# Patient Record
Sex: FEMALE | Race: WHITE | Employment: OTHER | URBAN - METROPOLITAN AREA
[De-identification: names, ages, dates, MRNs, and addresses within clinical notes are randomized per-mention and may not be internally consistent; named-entity substitution may affect disease eponyms.]

---

## 2017-07-07 ENCOUNTER — APPOINTMENT (OUTPATIENT)
Dept: GENERAL RADIOLOGY | Facility: HOSPITAL | Age: 77
End: 2017-07-07
Attending: EMERGENCY MEDICINE
Payer: MEDICARE

## 2017-07-07 ENCOUNTER — APPOINTMENT (OUTPATIENT)
Dept: CT IMAGING | Facility: HOSPITAL | Age: 77
End: 2017-07-07
Attending: EMERGENCY MEDICINE
Payer: MEDICARE

## 2017-07-07 ENCOUNTER — HOSPITAL ENCOUNTER (EMERGENCY)
Facility: HOSPITAL | Age: 77
Discharge: HOME | End: 2017-07-07
Attending: EMERGENCY MEDICINE
Payer: MEDICARE

## 2017-07-07 VITALS
TEMPERATURE: 98 F | SYSTOLIC BLOOD PRESSURE: 143 MMHG | RESPIRATION RATE: 18 BRPM | OXYGEN SATURATION: 98 % | HEIGHT: 62 IN | DIASTOLIC BLOOD PRESSURE: 50 MMHG | BODY MASS INDEX: 26.87 KG/M2 | WEIGHT: 146 LBS | HEART RATE: 64 BPM

## 2017-07-07 DIAGNOSIS — S01.81XA FACIAL LACERATION, INITIAL ENCOUNTER: ICD-10-CM

## 2017-07-07 DIAGNOSIS — S20.211A RIB CONTUSION, RIGHT, INITIAL ENCOUNTER: Primary | ICD-10-CM

## 2017-07-07 PROCEDURE — 71101 X-RAY EXAM UNILAT RIBS/CHEST: CPT | Performed by: EMERGENCY MEDICINE

## 2017-07-07 PROCEDURE — 12013 RPR F/E/E/N/L/M 2.6-5.0 CM: CPT

## 2017-07-07 PROCEDURE — 70450 CT HEAD/BRAIN W/O DYE: CPT | Performed by: EMERGENCY MEDICINE

## 2017-07-07 PROCEDURE — 99284 EMERGENCY DEPT VISIT MOD MDM: CPT

## 2017-07-07 RX ORDER — DOXEPIN HYDROCHLORIDE 50 MG/1
1 CAPSULE ORAL DAILY
COMMUNITY

## 2017-07-07 RX ORDER — HYDROCHLOROTHIAZIDE 25 MG/1
25 TABLET ORAL DAILY
COMMUNITY

## 2017-07-07 RX ORDER — PANTOPRAZOLE SODIUM 20 MG/1
20 TABLET, DELAYED RELEASE ORAL
COMMUNITY

## 2017-07-07 RX ORDER — CHOLECALCIFEROL (VITAMIN D3) 50 MCG
CAPSULE ORAL
COMMUNITY

## 2017-07-07 RX ORDER — NIACIN 1000 MG
1000 TABLET, EXTENDED RELEASE ORAL NIGHTLY
COMMUNITY

## 2017-07-07 NOTE — ED INITIAL ASSESSMENT (HPI)
Patient arrives with laceration to left forehead. Patient tripped over dog gait and landed on hardwood floor. Also c/o right scapular pain and rib pain.  Skin tear also on right wrist.

## 2017-07-07 NOTE — ED PROVIDER NOTES
Patient Seen in: [facility] Emergency Department    History   Patient presents with:  Laceration Abrasion (integumentary)  Fall (musculoskeletal, neurologic)    Stated Complaint: Fall, head lac    HPI    60-year-old female with a history diabetes, h agreed except as otherwise stated in HPI.     Physical Exam   ED Triage Vitals [07/07/17 0253]  BP: 155/58  Pulse: 72  Resp: 18  Temp: 97.7 °F (36.5 °C)  Temp src: Temporal  SpO2: 96 %  O2 Device: None (Room air)    Current:/50   Pulse 64   Temp 97.7 days.  Head injury precautions given.  ============================================================  MDM     Laceration was anesthetized with lidocaine with epinephrine topically. The wound was cleansed and irrigated copiously.   There was no visible forei

## 2023-10-15 ENCOUNTER — HOSPITAL ENCOUNTER (INPATIENT)
Facility: HOSPITAL | Age: 83
LOS: 3 days | Discharge: HOME HEALTH | DRG: 193 | End: 2023-10-18
Attending: EMERGENCY MEDICINE | Admitting: INTERNAL MEDICINE
Payer: MEDICARE

## 2023-10-15 ENCOUNTER — APPOINTMENT (OUTPATIENT)
Dept: GENERAL RADIOLOGY | Facility: HOSPITAL | Age: 83
DRG: 193 | End: 2023-10-15
Attending: EMERGENCY MEDICINE
Payer: MEDICARE

## 2023-10-15 ENCOUNTER — HOSPITAL ENCOUNTER (INPATIENT)
Facility: HOSPITAL | Age: 83
LOS: 3 days | Discharge: HOME | DRG: 193 | End: 2023-10-18
Attending: EMERGENCY MEDICINE | Admitting: INTERNAL MEDICINE
Payer: MEDICARE

## 2023-10-15 DIAGNOSIS — J18.9 COMMUNITY ACQUIRED PNEUMONIA, UNSPECIFIED LATERALITY: Primary | ICD-10-CM

## 2023-10-15 DIAGNOSIS — I50.9 ACUTE ON CHRONIC CONGESTIVE HEART FAILURE, UNSPECIFIED HEART FAILURE TYPE (HCC): ICD-10-CM

## 2023-10-15 DIAGNOSIS — R09.02 HYPOXIA: ICD-10-CM

## 2023-10-15 LAB
ALBUMIN SERPL-MCNC: 3.6 G/DL (ref 3.4–5)
ALBUMIN/GLOB SERPL: 1 {RATIO} (ref 1–2)
ALP LIVER SERPL-CCNC: 71 U/L
ALT SERPL-CCNC: 18 U/L
ANION GAP SERPL CALC-SCNC: 4 MMOL/L (ref 0–18)
APTT PPP: 35.2 SECONDS (ref 23.3–35.6)
AST SERPL-CCNC: 13 U/L (ref 15–37)
BASOPHILS # BLD AUTO: 0.04 X10(3) UL (ref 0–0.2)
BASOPHILS NFR BLD AUTO: 0.6 %
BILIRUB SERPL-MCNC: 0.7 MG/DL (ref 0.1–2)
BUN BLD-MCNC: 12 MG/DL (ref 7–18)
CALCIUM BLD-MCNC: 9.5 MG/DL (ref 8.5–10.1)
CHLORIDE SERPL-SCNC: 105 MMOL/L (ref 98–112)
CO2 SERPL-SCNC: 30 MMOL/L (ref 21–32)
CREAT BLD-MCNC: 0.94 MG/DL
EGFRCR SERPLBLD CKD-EPI 2021: 61 ML/MIN/1.73M2 (ref 60–?)
EOSINOPHIL # BLD AUTO: 0.03 X10(3) UL (ref 0–0.7)
EOSINOPHIL NFR BLD AUTO: 0.4 %
ERYTHROCYTE [DISTWIDTH] IN BLOOD BY AUTOMATED COUNT: 14.6 %
GLOBULIN PLAS-MCNC: 3.7 G/DL (ref 2.8–4.4)
GLUCOSE BLD-MCNC: 114 MG/DL (ref 70–99)
GLUCOSE BLD-MCNC: 167 MG/DL (ref 70–99)
HCT VFR BLD AUTO: 40.6 %
HGB BLD-MCNC: 13.3 G/DL
IMM GRANULOCYTES # BLD AUTO: 0.04 X10(3) UL (ref 0–1)
IMM GRANULOCYTES NFR BLD: 0.6 %
INR BLD: 1.22 (ref 0.85–1.16)
LACTATE SERPL-SCNC: 1.5 MMOL/L (ref 0.4–2)
LYMPHOCYTES # BLD AUTO: 1.04 X10(3) UL (ref 1–4)
LYMPHOCYTES NFR BLD AUTO: 14.8 %
MCH RBC QN AUTO: 26.8 PG (ref 26–34)
MCHC RBC AUTO-ENTMCNC: 32.8 G/DL (ref 31–37)
MCV RBC AUTO: 81.9 FL
MONOCYTES # BLD AUTO: 0.55 X10(3) UL (ref 0.1–1)
MONOCYTES NFR BLD AUTO: 7.8 %
NEUTROPHILS # BLD AUTO: 5.31 X10 (3) UL (ref 1.5–7.7)
NEUTROPHILS # BLD AUTO: 5.31 X10(3) UL (ref 1.5–7.7)
NEUTROPHILS NFR BLD AUTO: 75.8 %
NT-PROBNP SERPL-MCNC: 2374 PG/ML (ref ?–450)
OSMOLALITY SERPL CALC.SUM OF ELEC: 292 MOSM/KG (ref 275–295)
PLATELET # BLD AUTO: 203 10(3)UL (ref 150–450)
POTASSIUM SERPL-SCNC: 3.6 MMOL/L (ref 3.5–5.1)
PROCALCITONIN SERPL-MCNC: <0.05 NG/ML (ref ?–0.16)
PROT SERPL-MCNC: 7.3 G/DL (ref 6.4–8.2)
PROTHROMBIN TIME: 15.4 SECONDS (ref 11.6–14.8)
RBC # BLD AUTO: 4.96 X10(6)UL
SARS-COV-2 RNA RESP QL NAA+PROBE: NOT DETECTED
SODIUM SERPL-SCNC: 139 MMOL/L (ref 136–145)
TROPONIN I HIGH SENSITIVITY: 21 NG/L
WBC # BLD AUTO: 7 X10(3) UL (ref 4–11)

## 2023-10-15 PROCEDURE — 85025 COMPLETE CBC W/AUTO DIFF WBC: CPT | Performed by: EMERGENCY MEDICINE

## 2023-10-15 PROCEDURE — 82962 GLUCOSE BLOOD TEST: CPT

## 2023-10-15 PROCEDURE — 80053 COMPREHEN METABOLIC PANEL: CPT | Performed by: EMERGENCY MEDICINE

## 2023-10-15 PROCEDURE — 93010 ELECTROCARDIOGRAM REPORT: CPT

## 2023-10-15 PROCEDURE — 96365 THER/PROPH/DIAG IV INF INIT: CPT

## 2023-10-15 PROCEDURE — 84145 PROCALCITONIN (PCT): CPT | Performed by: EMERGENCY MEDICINE

## 2023-10-15 PROCEDURE — 85730 THROMBOPLASTIN TIME PARTIAL: CPT | Performed by: EMERGENCY MEDICINE

## 2023-10-15 PROCEDURE — 83036 HEMOGLOBIN GLYCOSYLATED A1C: CPT | Performed by: INTERNAL MEDICINE

## 2023-10-15 PROCEDURE — 84484 ASSAY OF TROPONIN QUANT: CPT | Performed by: EMERGENCY MEDICINE

## 2023-10-15 PROCEDURE — 99285 EMERGENCY DEPT VISIT HI MDM: CPT

## 2023-10-15 PROCEDURE — 71045 X-RAY EXAM CHEST 1 VIEW: CPT | Performed by: EMERGENCY MEDICINE

## 2023-10-15 PROCEDURE — 83605 ASSAY OF LACTIC ACID: CPT | Performed by: EMERGENCY MEDICINE

## 2023-10-15 PROCEDURE — 96375 TX/PRO/DX INJ NEW DRUG ADDON: CPT

## 2023-10-15 PROCEDURE — 83880 ASSAY OF NATRIURETIC PEPTIDE: CPT | Performed by: EMERGENCY MEDICINE

## 2023-10-15 PROCEDURE — 85610 PROTHROMBIN TIME: CPT | Performed by: EMERGENCY MEDICINE

## 2023-10-15 PROCEDURE — 93005 ELECTROCARDIOGRAM TRACING: CPT

## 2023-10-15 RX ORDER — ONDANSETRON 2 MG/ML
4 INJECTION INTRAMUSCULAR; INTRAVENOUS EVERY 6 HOURS PRN
Status: DISCONTINUED | OUTPATIENT
Start: 2023-10-15 | End: 2023-10-18

## 2023-10-15 RX ORDER — INSULIN GLARGINE 100 [IU]/ML
INJECTION, SOLUTION SUBCUTANEOUS NIGHTLY
COMMUNITY

## 2023-10-15 RX ORDER — DEXTROSE MONOHYDRATE 25 G/50ML
50 INJECTION, SOLUTION INTRAVENOUS
Status: DISCONTINUED | OUTPATIENT
Start: 2023-10-15 | End: 2023-10-18

## 2023-10-15 RX ORDER — PANTOPRAZOLE SODIUM 20 MG/1
20 TABLET, DELAYED RELEASE ORAL
Status: DISCONTINUED | OUTPATIENT
Start: 2023-10-16 | End: 2023-10-18

## 2023-10-15 RX ORDER — NICOTINE POLACRILEX 4 MG
30 LOZENGE BUCCAL
Status: DISCONTINUED | OUTPATIENT
Start: 2023-10-15 | End: 2023-10-18

## 2023-10-15 RX ORDER — NITROFURANTOIN 25; 75 MG/1; MG/1
100 CAPSULE ORAL 2 TIMES DAILY
COMMUNITY

## 2023-10-15 RX ORDER — MELATONIN
3 NIGHTLY PRN
Status: DISCONTINUED | OUTPATIENT
Start: 2023-10-15 | End: 2023-10-18

## 2023-10-15 RX ORDER — METOCLOPRAMIDE HYDROCHLORIDE 5 MG/ML
5 INJECTION INTRAMUSCULAR; INTRAVENOUS EVERY 8 HOURS PRN
Status: DISCONTINUED | OUTPATIENT
Start: 2023-10-15 | End: 2023-10-18

## 2023-10-15 RX ORDER — NICOTINE POLACRILEX 4 MG
15 LOZENGE BUCCAL
Status: DISCONTINUED | OUTPATIENT
Start: 2023-10-15 | End: 2023-10-18

## 2023-10-15 RX ORDER — ACETAMINOPHEN 500 MG
500 TABLET ORAL EVERY 4 HOURS PRN
Status: DISCONTINUED | OUTPATIENT
Start: 2023-10-15 | End: 2023-10-18

## 2023-10-15 RX ORDER — DICYCLOMINE HCL 20 MG
20 TABLET ORAL 3 TIMES DAILY PRN
COMMUNITY

## 2023-10-15 RX ORDER — ASPIRIN 81 MG/1
81 TABLET ORAL DAILY
COMMUNITY

## 2023-10-15 RX ORDER — LOPERAMIDE HYDROCHLORIDE 2 MG/1
2 CAPSULE ORAL AS NEEDED
COMMUNITY

## 2023-10-15 RX ORDER — FAMOTIDINE 20 MG/1
20 TABLET, FILM COATED ORAL 2 TIMES DAILY
COMMUNITY

## 2023-10-15 RX ORDER — METOPROLOL SUCCINATE 50 MG/1
50 TABLET, EXTENDED RELEASE ORAL DAILY
COMMUNITY

## 2023-10-15 RX ORDER — CRANBERRY FRUIT EXTRACT 425 MG
CAPSULE ORAL 2 TIMES DAILY
COMMUNITY

## 2023-10-15 RX ORDER — FUROSEMIDE 10 MG/ML
20 INJECTION INTRAMUSCULAR; INTRAVENOUS ONCE
Status: COMPLETED | OUTPATIENT
Start: 2023-10-15 | End: 2023-10-15

## 2023-10-15 RX ORDER — DONEPEZIL HYDROCHLORIDE 10 MG/1
10 TABLET, FILM COATED ORAL DAILY
COMMUNITY

## 2023-10-15 RX ORDER — MELATONIN
1000 NIGHTLY
Status: DISCONTINUED | OUTPATIENT
Start: 2023-10-15 | End: 2023-10-18

## 2023-10-15 RX ORDER — AMLODIPINE BESYLATE 5 MG/1
5 TABLET ORAL DAILY
COMMUNITY

## 2023-10-15 RX ORDER — ENOXAPARIN SODIUM 100 MG/ML
40 INJECTION SUBCUTANEOUS DAILY
Status: DISCONTINUED | OUTPATIENT
Start: 2023-10-16 | End: 2023-10-18

## 2023-10-15 NOTE — ED INITIAL ASSESSMENT (HPI)
Pt to ER with c/o chest congestion, cough, symptoms started this morning.  + Covid beginning of September. Pt Denies fever. Pt from Assisted living facility, x-ray done today. Sent for possible pneumonia.

## 2023-10-16 ENCOUNTER — APPOINTMENT (OUTPATIENT)
Dept: CV DIAGNOSTICS | Facility: HOSPITAL | Age: 83
DRG: 193 | End: 2023-10-16
Attending: INTERNAL MEDICINE
Payer: MEDICARE

## 2023-10-16 LAB
ANION GAP SERPL CALC-SCNC: 7 MMOL/L (ref 0–18)
ATRIAL RATE: 94 BPM
BASOPHILS # BLD AUTO: 0.02 X10(3) UL (ref 0–0.2)
BASOPHILS NFR BLD AUTO: 0.3 %
BUN BLD-MCNC: 12 MG/DL (ref 7–18)
CALCIUM BLD-MCNC: 9.2 MG/DL (ref 8.5–10.1)
CHLORIDE SERPL-SCNC: 106 MMOL/L (ref 98–112)
CO2 SERPL-SCNC: 28 MMOL/L (ref 21–32)
CREAT BLD-MCNC: 0.9 MG/DL
EGFRCR SERPLBLD CKD-EPI 2021: 64 ML/MIN/1.73M2 (ref 60–?)
EOSINOPHIL # BLD AUTO: 0.01 X10(3) UL (ref 0–0.7)
EOSINOPHIL NFR BLD AUTO: 0.1 %
ERYTHROCYTE [DISTWIDTH] IN BLOOD BY AUTOMATED COUNT: 14.6 %
GLUCOSE BLD-MCNC: 109 MG/DL (ref 70–99)
GLUCOSE BLD-MCNC: 151 MG/DL (ref 70–99)
GLUCOSE BLD-MCNC: 208 MG/DL (ref 70–99)
GLUCOSE BLD-MCNC: 214 MG/DL (ref 70–99)
GLUCOSE BLD-MCNC: 229 MG/DL (ref 70–99)
GLUCOSE BLD-MCNC: 94 MG/DL (ref 70–99)
HCT VFR BLD AUTO: 39.6 %
HGB BLD-MCNC: 12.8 G/DL
IMM GRANULOCYTES # BLD AUTO: 0.03 X10(3) UL (ref 0–1)
IMM GRANULOCYTES NFR BLD: 0.4 %
LYMPHOCYTES # BLD AUTO: 0.94 X10(3) UL (ref 1–4)
LYMPHOCYTES NFR BLD AUTO: 13.1 %
MCH RBC QN AUTO: 27.1 PG (ref 26–34)
MCHC RBC AUTO-ENTMCNC: 32.3 G/DL (ref 31–37)
MCV RBC AUTO: 83.7 FL
MONOCYTES # BLD AUTO: 0.72 X10(3) UL (ref 0.1–1)
MONOCYTES NFR BLD AUTO: 10 %
NEUTROPHILS # BLD AUTO: 5.46 X10 (3) UL (ref 1.5–7.7)
NEUTROPHILS # BLD AUTO: 5.46 X10(3) UL (ref 1.5–7.7)
NEUTROPHILS NFR BLD AUTO: 76.1 %
OSMOLALITY SERPL CALC.SUM OF ELEC: 292 MOSM/KG (ref 275–295)
P AXIS: 67 DEGREES
P-R INTERVAL: 152 MS
PLATELET # BLD AUTO: 179 10(3)UL (ref 150–450)
POTASSIUM SERPL-SCNC: 3.2 MMOL/L (ref 3.5–5.1)
Q-T INTERVAL: 370 MS
QRS DURATION: 70 MS
QTC CALCULATION (BEZET): 462 MS
R AXIS: 44 DEGREES
RBC # BLD AUTO: 4.73 X10(6)UL
SODIUM SERPL-SCNC: 141 MMOL/L (ref 136–145)
T AXIS: 77 DEGREES
VENTRICULAR RATE: 94 BPM
WBC # BLD AUTO: 7.2 X10(3) UL (ref 4–11)

## 2023-10-16 PROCEDURE — 82962 GLUCOSE BLOOD TEST: CPT

## 2023-10-16 PROCEDURE — 93306 TTE W/DOPPLER COMPLETE: CPT | Performed by: INTERNAL MEDICINE

## 2023-10-16 PROCEDURE — 85025 COMPLETE CBC W/AUTO DIFF WBC: CPT | Performed by: INTERNAL MEDICINE

## 2023-10-16 PROCEDURE — 80048 BASIC METABOLIC PNL TOTAL CA: CPT | Performed by: INTERNAL MEDICINE

## 2023-10-16 PROCEDURE — 94640 AIRWAY INHALATION TREATMENT: CPT

## 2023-10-16 RX ORDER — AMLODIPINE BESYLATE 5 MG/1
5 TABLET ORAL DAILY
Status: DISCONTINUED | OUTPATIENT
Start: 2023-10-16 | End: 2023-10-18

## 2023-10-16 RX ORDER — METOPROLOL SUCCINATE 50 MG/1
50 TABLET, EXTENDED RELEASE ORAL
Status: DISCONTINUED | OUTPATIENT
Start: 2023-10-16 | End: 2023-10-18

## 2023-10-16 RX ORDER — FUROSEMIDE 10 MG/ML
20 INJECTION INTRAMUSCULAR; INTRAVENOUS ONCE
Status: COMPLETED | OUTPATIENT
Start: 2023-10-16 | End: 2023-10-16

## 2023-10-16 RX ORDER — ASPIRIN 81 MG/1
81 TABLET ORAL DAILY
Status: DISCONTINUED | OUTPATIENT
Start: 2023-10-16 | End: 2023-10-18

## 2023-10-16 RX ORDER — IPRATROPIUM BROMIDE AND ALBUTEROL SULFATE 2.5; .5 MG/3ML; MG/3ML
3 SOLUTION RESPIRATORY (INHALATION) EVERY 6 HOURS PRN
Status: DISCONTINUED | OUTPATIENT
Start: 2023-10-16 | End: 2023-10-18

## 2023-10-16 RX ORDER — DONEPEZIL HYDROCHLORIDE 10 MG/1
10 TABLET, FILM COATED ORAL DAILY
Status: DISCONTINUED | OUTPATIENT
Start: 2023-10-16 | End: 2023-10-18

## 2023-10-16 NOTE — ED QUICK NOTES
Orders for admission, patient is aware of plan and ready to go upstairs. Any questions, please call ED RN Nerissa Cruz at extension [phone].      Patient Covid vaccination status: Fully vaccinated     COVID Test Ordered in ED: Rapid SARS-CoV-2 by PCR    COVID Suspicion at Admission: N/A    Running Infusions:      Mental Status/LOC at time of transport: AO x 4    Other pertinent information: PureWick  Oxygen via Nc @ 3L  CIWA score: N/A   NIH score:  N/A

## 2023-10-16 NOTE — PLAN OF CARE
Received patient on NC 2L, saturations dropped so O2 increased to 3L. Patient now saturating well. VSS. Medications administered per the [facility] and patient needs addressed. Patient is A & O x4, forgetful, incontinent with purewick in place, and up with assist and walker. Patient is up in locked bedside chair with chair alarm on and call light within reach.

## 2023-10-16 NOTE — PROGRESS NOTES
NURSING ADMISSION NOTE      Patient admitted via Cart  Oriented to room. Safety precautions initiated. Bed in low position. Call light in reach. AO x4. Forgetful. Received on 3L, will wean as tolerated. Baseline is RA. PRN Nebs. Tele - Nsr. Lovenox. Purewick. Incontinent. Briefed. Noreports of pain. Up x1 with walker. PT/OT. QID accuchecks. IV SL. Zosyn. Cardiac carb controlled diet. Pt updated on POC. No further needs at this moment. Plan for 2D ECHO.  Med rec and navigator completed with daughter at bedside,

## 2023-10-17 LAB
ANION GAP SERPL CALC-SCNC: 8 MMOL/L (ref 0–18)
BUN BLD-MCNC: 11 MG/DL (ref 7–18)
CALCIUM BLD-MCNC: 8.7 MG/DL (ref 8.5–10.1)
CHLORIDE SERPL-SCNC: 103 MMOL/L (ref 98–112)
CO2 SERPL-SCNC: 29 MMOL/L (ref 21–32)
CREAT BLD-MCNC: 0.91 MG/DL
EGFRCR SERPLBLD CKD-EPI 2021: 63 ML/MIN/1.73M2 (ref 60–?)
GLUCOSE BLD-MCNC: 127 MG/DL (ref 70–99)
GLUCOSE BLD-MCNC: 144 MG/DL (ref 70–99)
GLUCOSE BLD-MCNC: 168 MG/DL (ref 70–99)
GLUCOSE BLD-MCNC: 242 MG/DL (ref 70–99)
GLUCOSE BLD-MCNC: 97 MG/DL (ref 70–99)
HGBA1C: 6.4 %
MAGNESIUM SERPL-MCNC: 1.9 MG/DL (ref 1.6–2.6)
OSMOLALITY SERPL CALC.SUM OF ELEC: 292 MOSM/KG (ref 275–295)
POTASSIUM SERPL-SCNC: 3.5 MMOL/L (ref 3.5–5.1)
POTASSIUM SERPL-SCNC: 4.2 MMOL/L (ref 3.5–5.1)
SODIUM SERPL-SCNC: 140 MMOL/L (ref 136–145)

## 2023-10-17 PROCEDURE — 97166 OT EVAL MOD COMPLEX 45 MIN: CPT

## 2023-10-17 PROCEDURE — 97162 PT EVAL MOD COMPLEX 30 MIN: CPT

## 2023-10-17 PROCEDURE — 97116 GAIT TRAINING THERAPY: CPT

## 2023-10-17 PROCEDURE — 87449 NOS EACH ORGANISM AG IA: CPT | Performed by: HOSPITALIST

## 2023-10-17 PROCEDURE — 97530 THERAPEUTIC ACTIVITIES: CPT

## 2023-10-17 PROCEDURE — 80048 BASIC METABOLIC PNL TOTAL CA: CPT | Performed by: HOSPITALIST

## 2023-10-17 PROCEDURE — 84132 ASSAY OF SERUM POTASSIUM: CPT | Performed by: HOSPITALIST

## 2023-10-17 PROCEDURE — 97535 SELF CARE MNGMENT TRAINING: CPT

## 2023-10-17 PROCEDURE — 82962 GLUCOSE BLOOD TEST: CPT

## 2023-10-17 PROCEDURE — 83735 ASSAY OF MAGNESIUM: CPT | Performed by: HOSPITALIST

## 2023-10-17 RX ORDER — POTASSIUM CHLORIDE 20 MEQ/1
40 TABLET, EXTENDED RELEASE ORAL EVERY 4 HOURS
Status: COMPLETED | OUTPATIENT
Start: 2023-10-17 | End: 2023-10-17

## 2023-10-17 RX ORDER — FUROSEMIDE 10 MG/ML
40 INJECTION INTRAMUSCULAR; INTRAVENOUS ONCE
Status: COMPLETED | OUTPATIENT
Start: 2023-10-17 | End: 2023-10-17

## 2023-10-17 NOTE — PHYSICAL THERAPY NOTE
PHYSICAL THERAPY EVALUATION - INPATIENT     Room Number: 529/529-A  Evaluation Date: 10/17/2023  Type of Evaluation: Initial  Physician Order: PT Eval and Treat    Presenting Problem: CAP  Co-Morbidities : hx of + COVID 9/23, DM, HTN,  Reason for Therapy: Mobility Dysfunction and Discharge Planning    History related to current admission: Patient is a 81year old female admitted on 10/15/2023 from [facility] for SOB. Pt diagnosed with CAP. ASSESSMENT   In this PT evaluation, the patient presents with the following impairments Overall, decrease in functional skills and task tolerance affecting her bed mobilities, transfers and gt requiring increase time, assistance and use of an A.D. for safety and task completion. Pt is a high risk of fall  The patient is below baseline and would benefit from skilled inpatient PT to address the above deficits to assist patient in returning to prior to level of function. Functional outcome measures completed include AMPAC. The AM-PAC '6-Clicks' Inpatient Basic Mobility Short Form was completed and this patient is demonstrating a Approx Degree of Impairment: 50.57%  degree of impairment in mobility. Research supports that patients with this level of impairment may benefit from [address] . DISCHARGE RECOMMENDATIONS  PT Discharge Recommendations: Home with home health PT    PLAN  PT Treatment Plan: Bed mobility; Body mechanics; Energy conservation; Endurance; Family education;Gait training;Strengthening;Transfer training;Balance training  Rehab Potential : Good  Frequency (Obs): 3-5x/week  Number of Visits to Meet Established Goals: 5      CURRENT GOALS    Goal #1 Patient is able to demonstrate supine - sit EOB @ level: modified independent     Goal #2 Patient is able to demonstrate transfers Sit to/from Stand at assistance level: supervision     Goal #3 Patient is able to ambulate 300 feet with assist device: walker - rolling at assistance level: supervision     Goal #4 Pt will be ind/mod I in HEP for B LE while seated/supine     Goal #5    Goal #6    Goal Comments: Goals established on 10/17/2023    HOME SITUATION  Type of Home: Assisted living facility   Home Layout: One level                Lives With: Staff 24 hours  Drives: No  Patient Owned Equipment: Rollator       Prior Level of [place]:   Stated that she walk with her rollator walker all day long to the  and in between. Mod I in her bed mobilities and transfers    SUBJECTIVE  I walk a lot at home      OBJECTIVE     Fall Risk: High fall risk    WEIGHT BEARING RESTRICTION  Weight Bearing Restriction: None                PAIN ASSESSMENT  Ratin          COGNITION  Overall Cognitive Status:  WFL - within functional limits    RANGE OF MOTION AND STRENGTH ASSESSMENT  Upper extremity ROM and strength are within functional limits     Lower extremity ROM is within functional limits     Lower extremity strength is within functional limits       BALANCE  Static Sitting: Good  Dynamic Sitting: Good  Static Standing: Fair  Dynamic [address]        AM-PAC '6-Clicks' INPATIENT SHORT FORM - BASIC MOBILITY  How much difficulty does the patient currently have. .. Patient Difficulty: Turning over in bed (including adjusting bedclothes, sheets and blankets)?: A Little   Patient Difficulty: Sitting down on and standing up from a chair with arms (e.g., wheelchair, bedside commode, etc.): A Little   Patient Difficulty: Moving from lying on back to sitting on the side of the bed?: A Little   How much help from another person does the patient currently need. ..    Help from Another: Moving to and from a bed to a chair (including a wheelchair)?: A Little   Help from Another: Need to walk in hospital room?: A Little   Help from Another: Climbing 3-5 steps with a railing?: A Lot       AM-PAC Score:  Raw Score: 17   Approx Degree of Impairment: 50.57%   Standardized Score (AM-PAC Scale): 42.13   CMS Modifier (G-Code): CK    FUNCTIONAL ABILITY STATUS  Gait Assessment   Functional Mobility/Gait Assessment  Gait Assistance: Supervision  Distance (ft): 250  Assistive Device: Rolling walker  Pattern:  (Needs cueing in manuevering the RW for overall safety. Ptt is used to using a rollator at home)    Skilled Therapy Provided     Bed Mobility:  Rolling: SBA  Supine to sit: min A   Sit to supine: SBA     Transfer Mobility:  Sit to stand: min A/CGA   Stand to sit: CGA  Gait = amb with RW as support with SBA with slight SOB with desat to 88% after amb 250' with good recovery to 94% with rest.     Therapist's Comments:   Left on bed as per request  Discussed recommendation HHPT vs OPPT  Instructed pt to walk several times with staff while in [address] all issues and concerns. Nursing is aware of this visit. Exercise/Education Provided:  Bed mobility  Body mechanics  Functional activity tolerated  Gait training    Patient End of Session: In bed;Needs met;Call light within reach;RN aware of session/findings; All patient questions and concerns addressed      Patient Evaluation Complexity Level:  History Moderate - 1 or 2 personal factors and/or co-morbidities   Examination of body systems Moderate - addressing a total of 3 or more elements   Clinical Presentation Low - Stable   Clinical Decision Making Low - Stable       PT Session Time: 30 minutes  Gait Training: 10 minutes  Therapeutic Activity: 15 minutes

## 2023-10-17 NOTE — CM/SW NOTE
Pt is an 82 yo female admitted for pneumonia. Pt lives at [address] [facility]. PT is recommending . Pt was getting PT at [facility] prior to her admission. She wants to continue with PT at [facility] upon dc. SW following.      10/17/23 1500   CM/SW Referral Data   Referral Source Physician   Reason for Referral Discharge planning   Informant Patient;Daughter   Patient Info   Patient's Home Environment Assisted Living   Patient Status Prior to Admission   Services in place prior to admission [address] [facility]   Discharge Needs   Anticipated D/C needs Home health care

## 2023-10-17 NOTE — PLAN OF CARE
Pt is Aox4, wears glasses, R hearing aid, and upper partial, VSS on 2L and RA is baseline, sputum needed, Nebs q6, tele w/ NSR, receiving lovenox and IV Zosyn, afebrile, purewick in place d/t incontinence, up x1 w/ walker, QID accucheck. Call light is within reach and pt updated on POC.     Problem: Diabetes/Glucose Control  Goal: Glucose maintained within prescribed range  Description: INTERVENTIONS:  - Monitor Blood Glucose as ordered  - Assess for signs and symptoms of hyperglycemia and hypoglycemia  - Administer ordered medications to maintain glucose within target range  - Assess barriers to adequate nutritional intake and initiate nutrition consult as needed  - Instruct patient on self management of diabetes  Outcome: Progressing     Problem: Patient/Family Goals  Goal: Patient/Family Long Term Goal  Description: Patient's Long Term Goal: to dc home    Interventions:  - IV abx  - See additional Care Plan goals for specific interventions  Outcome: Progressing  Goal: Patient/Family Short Term Goal  Description: Patient's Short Term Goal: to feel better    Interventions:   - IV abx  - See additional Care Plan goals for specific interventions  Outcome: Progressing     Problem: RESPIRATORY - ADULT  Goal: Achieves optimal ventilation and oxygenation  Description: INTERVENTIONS:  - Assess for changes in respiratory status  - Assess for changes in mentation and behavior  - Position to facilitate oxygenation and minimize respiratory effort  - Oxygen supplementation based on oxygen saturation or ABGs  - Provide Smoking Cessation handout, if applicable  - Encourage broncho-pulmonary hygiene including cough, deep breathe, Incentive Spirometry  - Assess the need for suctioning and perform as needed  - Assess and instruct to report SOB or any respiratory difficulty  - Respiratory Therapy support as indicated  - Manage/alleviate anxiety  - Monitor for signs/symptoms of CO2 retention  Outcome: Progressing

## 2023-10-17 NOTE — PLAN OF CARE
A&Ox4. VSS. Afebrile. SPO2>90% on RA. Tele- NSR. Lovenox. Purewick. QID accucheck. Carb controlled diet. IV abx. Up x1 with walker. Patient's daughter and  at bedside, updated on POC. All questions answered at this time. Call light within reach.            Problem: Diabetes/Glucose Control  Goal: Glucose maintained within prescribed range  Description: INTERVENTIONS:  - Monitor Blood Glucose as ordered  - Assess for signs and symptoms of hyperglycemia and hypoglycemia  - Administer ordered medications to maintain glucose within target range  - Assess barriers to adequate nutritional intake and initiate nutrition consult as needed  - Instruct patient on self management of diabetes  Outcome: Progressing     Problem: Patient/Family Goals  Goal: Patient/Family Long Term Goal  Description: Patient's Long Term Goal: to dc home    Interventions:  - IV abx  - See additional Care Plan goals for specific interventions  Outcome: Progressing

## 2023-10-18 VITALS
BODY MASS INDEX: 27.97 KG/M2 | DIASTOLIC BLOOD PRESSURE: 74 MMHG | TEMPERATURE: 98 F | HEART RATE: 87 BPM | HEIGHT: 62 IN | WEIGHT: 152 LBS | RESPIRATION RATE: 28 BRPM | OXYGEN SATURATION: 91 % | SYSTOLIC BLOOD PRESSURE: 148 MMHG

## 2023-10-18 LAB
ANION GAP SERPL CALC-SCNC: 6 MMOL/L (ref 0–18)
BUN BLD-MCNC: 18 MG/DL (ref 7–18)
CALCIUM BLD-MCNC: 9.3 MG/DL (ref 8.5–10.1)
CHLORIDE SERPL-SCNC: 105 MMOL/L (ref 98–112)
CO2 SERPL-SCNC: 28 MMOL/L (ref 21–32)
CREAT BLD-MCNC: 1.03 MG/DL
EGFRCR SERPLBLD CKD-EPI 2021: 54 ML/MIN/1.73M2 (ref 60–?)
GLUCOSE BLD-MCNC: 138 MG/DL (ref 70–99)
GLUCOSE BLD-MCNC: 181 MG/DL (ref 70–99)
L PNEUMO AG UR QL: NEGATIVE
MAGNESIUM SERPL-MCNC: 2 MG/DL (ref 1.6–2.6)
OSMOLALITY SERPL CALC.SUM OF ELEC: 292 MOSM/KG (ref 275–295)
POTASSIUM SERPL-SCNC: 3.8 MMOL/L (ref 3.5–5.1)
SODIUM SERPL-SCNC: 139 MMOL/L (ref 136–145)
STREP PNEUMO ANTIGEN, URINE: NEGATIVE

## 2023-10-18 PROCEDURE — 83735 ASSAY OF MAGNESIUM: CPT | Performed by: HOSPITALIST

## 2023-10-18 PROCEDURE — 82962 GLUCOSE BLOOD TEST: CPT

## 2023-10-18 PROCEDURE — 80048 BASIC METABOLIC PNL TOTAL CA: CPT | Performed by: HOSPITALIST

## 2023-10-18 RX ORDER — FUROSEMIDE 20 MG/1
20 TABLET ORAL DAILY
Qty: 30 TABLET | Refills: 0 | Status: SHIPPED | OUTPATIENT
Start: 2023-10-18

## 2023-10-18 RX ORDER — AMOXICILLIN AND CLAVULANATE POTASSIUM 875; 125 MG/1; MG/1
875 TABLET, FILM COATED ORAL 2 TIMES DAILY
Status: DISCONTINUED | OUTPATIENT
Start: 2023-10-18 | End: 2023-10-18

## 2023-10-18 RX ORDER — AMOXICILLIN AND CLAVULANATE POTASSIUM 875; 125 MG/1; MG/1
875 TABLET, FILM COATED ORAL 2 TIMES DAILY
Qty: 14 TABLET | Refills: 0 | Status: SHIPPED | OUTPATIENT
Start: 2023-10-18

## 2023-10-18 RX ORDER — POTASSIUM CHLORIDE 1.5 G/1.58G
40 POWDER, FOR SOLUTION ORAL ONCE
Status: COMPLETED | OUTPATIENT
Start: 2023-10-18 | End: 2023-10-18

## 2023-10-18 RX ORDER — POTASSIUM CHLORIDE 750 MG/1
10 TABLET, EXTENDED RELEASE ORAL DAILY
Qty: 30 TABLET | Refills: 0 | Status: SHIPPED | OUTPATIENT
Start: 2023-10-18

## 2023-10-18 NOTE — PROGRESS NOTES
NURSING DISCHARGE NOTE    Discharged Nursing home via Wheelchair. Accompanied by Family member  Belongings Taken by patient/family. Patient and daughter verbalized understanding of discharge instructions.

## 2023-10-18 NOTE — DISCHARGE SUMMARY
General Medicine Discharge Summary     Patient ID:  Rigoberto Camacho  81year old  [id number]  1/15/1940    Admit date: 10/15/2023    Discharge date and time: 10/18/2023  2:14 PM     Attending Physician: No att. providers found     Primary Care Physician: Jackie Mccoy MD     Reason for admission: sob      Risk of readmission: Rigoberto Camacho has Moderate Risk of readmission after discharge from the hospital.    Hospital Discharge Diagnoses:  pneumonia, acute on chronic diastolic HF    Lace+ Score: 64  59-90 High Risk  29-58 Medium Risk  0-28   Low Risk. TCM Follow-Up Recommendation:  LACE 29-58: Moderate Risk of readmission after discharge from the hospital.          Discharge Condition: alive    Important follow up  -PCP Jackie Mccoy MD see appointments listed below    -Labs: prn  -Radiology:  prn         Hospital Course:    80 yo woman with h/o dm, gerd, htn who presented with cough, sob and concern for b/l pna versus HF exacerbation. 1) sob/cough-possibly related to pneumonia, acute on chronic diastolic HF-- improved, O2 requirements improved with diuretics and abx  - procal neg, bnp elevated to 2K range. . neg strep/legionella urine ag  - sp iv zosyn-->  augmentin  - gentle diuresis po outpatient- lasix 20 mg daily with 10 meq potassium. Follow up with cardiology outpt  - echo with nl ef, +DD, pasp 60, mild aortic stenosis. - o2 walk  - encourage IS  - pt with over 60 year tob history, currently still smokes 5-6 cigarettes/day. Not interesting in quitting. Has never been given diagnosis of copd but plausible that her o2 sats may chronically run on lower end.      2) dm  - levemir listed on outpt med rec, unclear dose. Currently glc okay.  On medium dose ssi     3) htn  - on amlodipine, toprol     4) gerd- ppi  Consults: None    Radiology:  CARD ECHO 2D DOPPLER (CPT=93306)    Result Date: 10/16/2023  Transthoracic Echocardiogram Name:Vijay Love Date: 10/16/2023 :  01/15/1940 Ht:  (62in)  BP: 140 / 85 MRN:  [id number]    Age:  81years    Wt:  (152lb) HR: Loc:  [facility]       Gndr: F          BSA: 1.7m^2 Sonographer: James Ramirez RDCS Ordering:    Ruchi Antony Consulting:  Ruchi Hardy ---------------------------------------------------------------------------- History/Indications:   Dyspnea. Pneumonia. ---------------------------------------------------------------------------- Procedure information:  A transthoracic complete 2D study was performed. Additional evaluation included M-mode, complete spectral Doppler, and color Doppler. Patient status:  Inpatient. Location:  Bedside. No prior study was available for comparison. This was a routine study. Transthoracic echocardiography for diagnosis and ventricular function evaluation. Image quality was adequate. ECG rhythm:   Normal sinus  Study completion:  There were no complications. ---------------------------------------------------------------------------- Conclusions: 1. Left ventricle: The cavity size was normal. Wall thickness was normal.    Systolic function was normal. The estimated ejection fraction was 60-65%. Features are consistent with a pseudonormal left ventricular filling    pattern, with concomitant abnormal relaxation and increased filling    pressure - grade 2 diastolic dysfunction. 2. Left atrium: The atrium was markedly dilated. 3. Right atrium: The atrium was markedly dilated. 4. Aortic valve: The valve was trileaflet. The leaflets were mildly    thickened and mildly calcified. Cusp separation was mildly reduced. Transvalvular velocity was increased, due to stenosis. The findings were    consistent with mild stenosis. The peak systolic velocity was 0.52G/AEX. The mean systolic gradient was 8mm Hg. The valve area (VTI) was 1.41cm^2. The valve area (VTI) index was 0.83cm^2/m^2. 5. Mitral valve: There was mild regurgitation. The mean diastolic gradient    was 3mm Hg.  6. Pulmonary arteries: Systolic pressure was moderately to markedly    increased. The peak systolic pressure is 41QR Hg. 7. Pericardium, extracardiac: There was a left pleural effusion. Impressions:  No previous study was available for comparison. * ---------------------------------------------------------------------------- * Findings: Left ventricle: The cavity size was normal. Wall thickness was normal. Systolic function was normal. The estimated ejection fraction was 60-65%. No diagnostic evidence for diffuse regional wall motion abnormalities. Features are consistent with a pseudonormal left ventricular filling pattern, with concomitant abnormal relaxation and increased filling pressure - grade 2 diastolic dysfunction. Left atrium:  The atrium was markedly dilated. Right ventricle: The cavity size was normal. Systolic function was normal. Systolic pressure was moderately increased. Right atrium:  The atrium was markedly dilated. Mitral valve: The annulus was mildly calcified. The leaflets were mildly thickened and mildly calcified. Leaflet separation was normal.  Doppler: Transvalvular velocity was within the normal range. There was no evidence for significant stenosis. There was mild regurgitation. The valve area (LVOT continuity) was 2.09cm^2. The mean diastolic gradient was 3mm Hg. Aortic valve: The valve was trileaflet. The leaflets were mildly thickened and mildly calcified. Cusp separation was mildly reduced. Doppler: Transvalvular velocity was increased, due to stenosis. The findings were consistent with mild stenosis. There was no significant regurgitation. The valve area (VTI) was 1.41cm^2. The valve area (VTI) index was 0.83cm^2/m^2. The mean systolic gradient was 8mm Hg. The peak systolic gradient was 08PE Hg. Tricuspid valve: The valve is structurally normal. Leaflet separation was normal.  Doppler:  Transvalvular velocity was within the normal range. There was no evidence for stenosis. There was no significant regurgitation.  Pulmonic valve:    There was no significant valve disease. Doppler: Transvalvular velocity was within the normal range. There was no evidence for stenosis. There was no significant regurgitation. Pericardium:  A trivial pericardial effusion was identified. There was no evidence of hemodynamic compromise. Pleura: There was a left pleural effusion. Aorta: Aortic root: The aortic root was normal-sized. Ascending aorta: The ascending aorta was normal. Pulmonary arteries: Systolic pressure was moderately to markedly increased. Systemic veins: Inferior vena cava:  The IVC was normally collapsible and normal-sized. ---------------------------------------------------------------------------- Measurements  Left ventricle                  Value          Ref  IVS thickness, ED, PLAX         0.8   cm       0.6 - 0.9  LV ID, ED, PLAX                 3.9   cm       3.8 - 5.2  LV ID, ES, PLAX                 2.5   cm       2.2 - 3.5  LV PW thickness, ED, PLAX       0.9   cm       0.6 - 0.9  IVS/LV PW ratio, ED, PLAX       0.96           ---------  LV PW/LV ID ratio, ED, PLAX     0.22           ---------  LV ejection fraction            65    %        54 - 74  Stroke volume/bsa, 2D           32    ml/m^2   ---------  LV e', medial               (L) 4.7   cm/sec   >=7.0  LV E/e', medial                 30             ---------  LVOT                            Value          Ref  LVOT ID                         2     cm       ---------  LVOT peak velocity, S           0.98  m/sec    ---------  LVOT VTI, S                     17.1  cm       ---------  LVOT mean gradient, S           2     mm Hg    ---------  Stroke volume (SV), LVOT DP     54    ml       ---------  Stroke index (SV/bsa), LVOT     32    ml/m^2   ---------  DP  Aortic valve                    Value          Ref  Aortic valve peak velocity,     2.05  m/sec    ---------  S  Aortic valve VTI, S             38.2  cm       ---------  Aortic mean gradient, S         8     mm Hg ---------  Aortic peak gradient, S         17    mm Hg    ---------  Aortic valve area, VTI          1.41  cm^2     ---------  Aortic valve area/bsa, VTI      0.83  cm^2/m^2 ---------  Velocity ratio, peak,           0.48           ---------  LVOT/AV  Aortic root                     Value          Ref  Aortic root ID, ED              3.0   cm       2.4 - 3.9  Ascending aorta                 Value          Ref  Ascending aorta ID, A-P, ED     3.2   cm       1.9 - 3.5  Left atrium                     Value          Ref  LA ID, A-P, ES              (H) 5.1   cm       2.7 - 3.8  LA volume, ES, 1-p A4C      (H) 100   ml       22 - 52  LA/aortic root ratio            1.7            ---------  Mitral valve                    Value          Ref  Mitral E-wave peak velocity     1.4   m/sec    ---------  Mitral A-wave peak velocity     0.47  m/sec    ---------  Mitral mean gradient, D         3     mm Hg    ---------  Mitral peak gradient, D         8     mm Hg    ---------  Mitral E/A ratio, peak          2.95           ---------  Mitral valve area, LVOT         2.09  cm^2     ---------  continuity  Pulmonary artery                Value          Ref  PA pressure, S, DP              60    mm Hg    ---------  Tricuspid valve                 Value          Ref  Tricuspid regurg peak       (H) 3.71  m/sec    <=2.8  velocity  Tricuspid peak RV-RA            55    mm Hg    ---------  gradient  Systemic veins                  Value          Ref  Estimated CVP                   5     mm Hg    ---------  Right ventricle                 Value          Ref  RV pressure, S, DP              60    mm Hg    --------- Legend: (L)  and  (H)  deana values outside specified reference range. ---------------------------------------------------------------------------- Prepared and electronically signed by Soraya Cuellar MD 10/16/2023 14:13     XR CHEST AP PORTABLE  (CPT=71045)    Result Date: 10/15/2023  PROCEDURE:  XR CHEST AP PORTABLE (CPT=71045)  TECHNIQUE:  AP chest radiograph was obtained. COMPARISON:  [facility] , XR, XR RIBS WITH CHEST (3 VIEWS), RIGHT  (CPT=71101), 7/07/2017, 3:17 AM.  INDICATIONS:  Congested, possible pneumonia  PATIENT STATED HISTORY: (As transcribed by Technologist)  Patient stated having congestion today. FINDINGS:  Mild pulmonary venous congestion. There is patchy airspace disease within the lung bases. Trace bilateral pleural effusions. No measurable pneumothorax. Enlarged cardiac silhouette. Aortic atherosclerosis. CONCLUSION:  1. There is patchy airspace disease within the lung bases which may represent pneumonia. Follow-up imaging after treatment is recommended to ensure resolution. 2. Mild pulmonary venous congestion with trace bilateral pleural effusions. Enlarged cardiac silhouette. Clinical correlation for CHF with volume overload is suggested. [facility] [place]      Dictated by (CST): Vera Arroyo MD on 10/15/2023 at 8:02 PM     Finalized by (CST): Vera Arroyo MD on 10/15/2023 at 8:03 PM         Operative Procedures:      Disposition: alive    Patient Instructions: Current and discharge medications reviewed with patient  Discharge Medication List as of 10/18/2023 10:26 AM    START taking these medications    furosemide 20 MG Oral Tab  Take 1 tablet (20 mg total) by mouth daily. Follow up with cardiology for further management, Print Script, Disp-30 tablet, R-0    potassium chloride 10 MEQ Oral Tab CR  Take 1 tablet (10 mEq total) by mouth daily. Take while on lasix, Print Script, Disp-30 tablet, R-0    amoxicillin clavulanate 875-125 MG Oral Tab  Take 1 tablet (875 mg total) by mouth in the morning and 1 tablet (875 mg total) before bedtime. , Print Script, Disp-14 tablet, R-0      CONTINUE these medications which have NOT CHANGED    amLODIPine 5 MG Oral Tab  Take 1 tablet (5 mg total) by mouth daily. , Historical    aspirin 81 MG Oral Tab EC  Take 1 tablet (81 mg total) by mouth daily., Historical    Cranberry 425 MG Oral Cap  Take by mouth 2 (two) times daily. , Historical    donepezil 10 MG Oral Tab  Take 1 tablet (10 mg total) by mouth daily. , Historical    famotidine 20 MG Oral Tab  Take 1 tablet (20 mg total) by mouth 2 (two) times daily. , Historical    insulin glargine 100 UNIT/ML Subcutaneous Solution  Inject into the skin nightly., Historical    metoprolol succinate ER 50 MG Oral Tablet 24 Hr  Take 1 tablet (50 mg total) by mouth daily. , Historical    nitrofurantoin monohydrate macro 100 MG Oral Cap  Take 1 capsule (100 mg total) by mouth 2 (two) times daily. , Historical    dicyclomine 20 MG Oral Tab  Take 1 tablet (20 mg total) by mouth 3 (three) times daily as needed., Historical    loperamide 2 MG Oral Cap  Take 1 capsule (2 mg total) by mouth as needed for Diarrhea., Historical    MetFORMIN HCl 1000 MG Oral Tab  Take 1 tablet (1,000 mg total) by mouth 2 (two) times daily with meals. , Historical    Cholecalciferol (VITAMIN D) 1000 units Oral Tab  Take 1,000 Units by mouth., Historical    Niacin ER 1000 MG Oral Tab CR  Take 1 tablet (1,000 mg total) by mouth nightly., Historical    B Complex-C-Folic Acid (HM VITAMIN B COMPLEX/VITAMIN C) Oral Tab  Take by mouth., Historical    Linagliptin (TRADJENTA) 5 MG Oral Tab  Take 1 tablet (5 mg total) by mouth., Historical    Pantoprazole Sodium 20 MG Oral Tab EC  Take 1 tablet (20 mg total) by mouth every morning before breakfast., Historical    multivitamin Oral Tab  Take 1 tablet by mouth daily. , Historical      STOP taking these medications    hydrochlorothiazide 25 MG Oral Tab          Home Medication Changes:      Activity: as directed  Diet: as directed  Wound Care:  prn  Code Status: No Order  O2: prn    Follow-up with           Follow up with Leroy Avalos in 1 week(s)  Specialty: Internal Medicine  Follow up [address] [place], [place] [address]  [phone]           Schedule an appointment with Dahlia Moncada as soon as possible for a visit in 1 week(s)  Specialty: [address] [place] [address] 349  [facility] [place] [address]  Delvin Denise [phone]             Total Time Coordinating Care: 35 minutes    Patient had opportunity to ask questions and state understand and agree with therapeutic plan as outlined    Thank You,  Radha Biswas, [facility] [place]  Internal Medicine  Answering Service number: [phone]

## 2023-10-18 NOTE — PLAN OF CARE
Patient is A/Ox4. VSS. Afebrile. Patient denies pain. Patient is on RA. . Lung sounds diminished. No Cough/SOB with exertion. on Tele NSR. Lovenox. SCD on. Electrolyte protocol-non cardiac. 1800 ADA Diet/QID Accuchecks. Denies any N/V/Diarrhea. Up with walker. Patient and family updated on plan of care.     Problem: Diabetes/Glucose Control  Goal: Glucose maintained within prescribed range  Description: INTERVENTIONS:  - Monitor Blood Glucose as ordered  - Assess for signs and symptoms of hyperglycemia and hypoglycemia  - Administer ordered medications to maintain glucose within target range  - Assess barriers to adequate nutritional intake and initiate nutrition consult as needed  - Instruct patient on self management of diabetes  Outcome: Completed     Problem: Patient/Family Goals  Goal: Patient/Family Long Term Goal  Description: Patient's Long Term Goal: to dc home    Interventions:  - IV abx  - See additional Care Plan goals for specific interventions  Outcome: Completed  Goal: Patient/Family Short Term Goal  Description: Patient's Short Term Goal: to feel better    Interventions:   - IV abx  - See additional Care Plan goals for specific interventions  Outcome: Completed     Problem: RESPIRATORY - ADULT  Goal: Achieves optimal ventilation and oxygenation  Description: INTERVENTIONS:  - Assess for changes in respiratory status  - Assess for changes in mentation and behavior  - Position to facilitate oxygenation and minimize respiratory effort  - Oxygen supplementation based on oxygen saturation or ABGs  - Provide Smoking Cessation handout, if applicable  - Encourage broncho-pulmonary hygiene including cough, deep breathe, Incentive Spirometry  - Assess the need for suctioning and perform as needed  - Assess and instruct to report SOB or any respiratory difficulty  - Respiratory Therapy support as indicated  - Manage/alleviate anxiety  - Monitor for signs/symptoms of CO2 retention  Outcome: Completed

## 2023-10-18 NOTE — PROGRESS NOTES
10/18/23 1026   Mobility   O2 walk?  Yes   SPO2% on Room Air at Rest 97   At rest oxygen flow (liters per minute) 0   SPO2% Ambulation on Room Air 97   SPO2% Ambulation on Oxygen 97   Ambulation oxygen flow (liters per minute) 0

## 2023-10-18 NOTE — PLAN OF CARE
Pt is Aox4, wears glasses, R hearing aid, and upper partial, VSS on RA, O2 walk is needed, Nebs q6, tele w/ NSR, receiving lovenox and IV Zosyn, afebrile, purewick in place d/t incontinence, up x1 w/ walker, QID accucheck. Call light is within reach and pt updated on POC.      Problem: Diabetes/Glucose Control  Goal: Glucose maintained within prescribed range  Description: INTERVENTIONS:  - Monitor Blood Glucose as ordered  - Assess for signs and symptoms of hyperglycemia and hypoglycemia  - Administer ordered medications to maintain glucose within target range  - Assess barriers to adequate nutritional intake and initiate nutrition consult as needed  - Instruct patient on self management of diabetes  Outcome: Progressing     Problem: Patient/Family Goals  Goal: Patient/Family Long Term Goal  Description: Patient's Long Term Goal: to dc home    Interventions:  - IV abx  - See additional Care Plan goals for specific interventions  Outcome: Progressing  Goal: Patient/Family Short Term Goal  Description: Patient's Short Term Goal: to feel better    Interventions:   - IV abx  - See additional Care Plan goals for specific interventions  Outcome: Progressing     Problem: RESPIRATORY - ADULT  Goal: Achieves optimal ventilation and oxygenation  Description: INTERVENTIONS:  - Assess for changes in respiratory status  - Assess for changes in mentation and behavior  - Position to facilitate oxygenation and minimize respiratory effort  - Oxygen supplementation based on oxygen saturation or ABGs  - Provide Smoking Cessation handout, if applicable  - Encourage broncho-pulmonary hygiene including cough, deep breathe, Incentive Spirometry  - Assess the need for suctioning and perform as needed  - Assess and instruct to report SOB or any respiratory difficulty  - Respiratory Therapy support as indicated  - Manage/alleviate anxiety  - Monitor for signs/symptoms of CO2 retention  Outcome: Progressing

## 2024-04-22 ENCOUNTER — HOSPITAL ENCOUNTER (EMERGENCY)
Facility: HOSPITAL | Age: 84
Discharge: HOME | End: 2024-04-22
Attending: EMERGENCY MEDICINE
Payer: MEDICARE

## 2024-04-22 VITALS
SYSTOLIC BLOOD PRESSURE: 162 MMHG | DIASTOLIC BLOOD PRESSURE: 78 MMHG | OXYGEN SATURATION: 95 % | WEIGHT: 149.88 LBS | BODY MASS INDEX: 27.58 KG/M2 | RESPIRATION RATE: 18 BRPM | TEMPERATURE: 98 F | HEIGHT: 62 IN | HEART RATE: 62 BPM

## 2024-04-22 DIAGNOSIS — S09.90XA MINOR HEAD INJURY, INITIAL ENCOUNTER: ICD-10-CM
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
## 2024-04-22 DIAGNOSIS — S50.811A ABRASION OF RIGHT FOREARM, INITIAL ENCOUNTER: ICD-10-CM

## 2024-04-22 PROCEDURE — 99283 EMERGENCY DEPT VISIT LOW MDM: CPT

## 2024-04-22 PROCEDURE — 99282 EMERGENCY DEPT VISIT SF MDM: CPT

## 2024-04-22 NOTE — ED INITIAL ASSESSMENT (HPI)
Brought by medic from [facility]  with history of fell down while she ws  walking with a roller walker.hit her head on the side of the walker . Complaining of pain at the back  of the head . No loss of consciousness . Not on any blood thinner.

## 2024-04-22 NOTE — ED PROVIDER NOTES
Patient Seen in: [facility] Emergency Department      History     Chief Complaint   Patient presents with    Fall    Head Injury    Laceration/Abrasion     Stated Complaint: fall  from seated walker, hit head, no LOC,  + skin tear    Subjective:   HPI    84-year-old female presents to the emergency department after a fall.  Patient states that she was using a walker that rolls.  She states that the brakes were not working on it.  She was looking to do a jigsaw puzzle and the walker lurched away from her and she subsequently fell backward onto her bottom.  She states she did strike her head but only on the chair of the walker.  She states that her primary area of falling was onto her buttocks area.  She has no complaints of buttock or hip pain.  She did sustain a minor abrasion to her right forearm.  She states that was cleaned and dressed prior to my evaluation.  She is not on any anticoagulants.  She had no loss of consciousness.  She has had no focal weakness or numbness.  Injury occurred approximately 5 hours ago and she states that she does not have a headache history of or any other acute complaints.    Objective:   Past Medical History:    Asthma (HCC)    Diabetes (HCC)    Esophageal reflux    Essential hypertension    Hearing impairment    High blood pressure              Past Surgical History:   Procedure Laterality Date    Removal gallbladder      Tonsillectomy                  Social History     Socioeconomic History    Marital status:    Tobacco Use    Smoking status: Every Day     Current packs/day: 0.50     Types: Cigarettes   Vaping Use    Vaping status: Never Used   Substance and Sexual Activity    Alcohol use: Not Currently    Drug use: Never     Social Determinants of Health     Food Insecurity: No Food Insecurity (10/15/2023)    Food Insecurity     Food Insecurity: Never true   Transportation Needs: No Transportation Needs (10/15/2023)    Transportation Needs     Lack of  Transportation: No   Housing Stability: Low Risk  (10/15/2023)    Housing Stability     Housing Instability: No              Review of Systems   All other systems reviewed and are negative.      Positive for stated complaint: fall  from seated walker, hit head, no LOC,  + skin tear  Other systems are as noted in HPI.  Constitutional and vital signs reviewed.      All other systems reviewed and negative except as noted above.    Physical Exam     ED Triage Vitals [04/22/24 1238]   BP (!) 162/78   Pulse 62   Resp 18   Temp 97.8 °F (36.6 °C)   Temp src Temporal   SpO2 95 %   O2 Device None (Room air)       Current:BP (!) 162/78   Pulse 62   Temp 97.8 °F (36.6 °C) (Temporal)   Resp 18   Ht 157.5 cm (5' 2\")   Wt 68 kg   SpO2 95%   BMI 27.42 kg/m²         Physical Exam  Vitals and nursing note reviewed.   Constitutional:       Appearance: She is well-developed.   HENT:      Head: Normocephalic and atraumatic.   Cardiovascular:      Rate and Rhythm: Normal rate and regular rhythm.      Heart sounds: Normal heart sounds.   Pulmonary:      Effort: Pulmonary effort is normal.      Breath sounds: Normal breath sounds. No stridor. No wheezing.   Abdominal:      General: Bowel sounds are normal.      Palpations: Abdomen is soft.      Tenderness: There is no abdominal tenderness. There is no rebound.   Musculoskeletal:         General: Signs of injury present. No tenderness. Normal range of motion.      Cervical back: Normal range of motion and neck supple.   Lymphadenopathy:      Cervical: No cervical adenopathy.   Skin:     General: Skin is warm and dry.      Coloration: Skin is not pale.      Findings: Bruising present.      Comments: Bruise on left forearm, abrasion on right forearm that is more of a skin tear.   Neurological:      General: No focal deficit present.      Mental Status: She is alert and oriented to person, place, and time.      Cranial Nerves: No cranial nerve deficit.      Sensory: No sensory deficit.       Coordination: Coordination normal.              ED Course   Labs Reviewed - No data to display                   MDM      Patient has no acute focal neurologic deficit.  I reviewed the mechanism of the fall and injury repeatedly with the patient and she did not have any primary head injury it was again onto her buttock region and then she states she hit her head on the side of the walker.  She is not on anticoagulation and has no complaints I can fully range her hips and she is up and ambulatory in the emergency department no distress.  She is eating and drinking normally with no emesis.  She was offered imaging and declined and I agree that she does not necessarily require any advanced imaging with no distinct mechanism and or being on anticoagulation.  Patient states she feels good and would like to go home.  She was subsequently discharged                               Medical Decision Making      Disposition and Plan     Clinical Impression:  1. Fall, initial encounter    2. Abrasion of right forearm, initial encounter    3. Minor head injury, initial encounter         Disposition:  Discharge  4/22/2024  4:15 pm    Follow-up:  Trell Isabel  [address] [place] [id number] [phone]    Schedule an appointment as soon as possible for a visit  As needed          Medications Prescribed:  Current Discharge Medication List

## 2025-02-07 ENCOUNTER — HOSPITAL ENCOUNTER (INPATIENT)
Facility: HOSPITAL | Age: 85
LOS: 1 days | Discharge: NURSING, REHAB OR LONG-TERM CARE | End: 2025-02-08
Attending: EMERGENCY MEDICINE | Admitting: INTERNAL MEDICINE
Payer: MEDICARE

## 2025-02-07 ENCOUNTER — APPOINTMENT (OUTPATIENT)
Dept: CV DIAGNOSTICS | Facility: HOSPITAL | Age: 85
End: 2025-02-07
Attending: NURSE PRACTITIONER
Payer: MEDICARE

## 2025-02-07 ENCOUNTER — APPOINTMENT (OUTPATIENT)
Dept: GENERAL RADIOLOGY | Facility: HOSPITAL | Age: 85
End: 2025-02-07
Payer: MEDICARE

## 2025-02-07 DIAGNOSIS — I50.23 ACUTE ON CHRONIC SYSTOLIC CONGESTIVE HEART FAILURE (HCC): Primary | ICD-10-CM

## 2025-02-07 LAB
ALBUMIN SERPL-MCNC: 4.3 G/DL (ref 3.2–4.8)
ALBUMIN SERPL-MCNC: 4.5 G/DL (ref 3.2–4.8)
ALBUMIN/GLOB SERPL: 1.6 {RATIO} (ref 1–2)
ALBUMIN/GLOB SERPL: 1.9 {RATIO} (ref 1–2)
ALP LIVER SERPL-CCNC: 60 U/L
ALP LIVER SERPL-CCNC: 64 U/L
ALT SERPL-CCNC: 8 U/L
ALT SERPL-CCNC: 9 U/L
ANION GAP SERPL CALC-SCNC: 13 MMOL/L (ref 0–18)
ANION GAP SERPL CALC-SCNC: 9 MMOL/L (ref 0–18)
AST SERPL-CCNC: 15 U/L (ref ?–34)
AST SERPL-CCNC: 17 U/L (ref ?–34)
ATRIAL RATE: 79 BPM
BASOPHILS # BLD AUTO: 0.03 X10(3) UL (ref 0–0.2)
BASOPHILS # BLD AUTO: 0.04 X10(3) UL (ref 0–0.2)
BASOPHILS NFR BLD AUTO: 0.3 %
BASOPHILS NFR BLD AUTO: 0.4 %
BILIRUB SERPL-MCNC: 0.6 MG/DL (ref 0.2–1.1)
BILIRUB SERPL-MCNC: 0.7 MG/DL (ref 0.2–1.1)
BUN BLD-MCNC: 10 MG/DL (ref 9–23)
BUN BLD-MCNC: 11 MG/DL (ref 9–23)
CALCIUM BLD-MCNC: 9.1 MG/DL (ref 8.7–10.6)
CALCIUM BLD-MCNC: 9.4 MG/DL (ref 8.7–10.6)
CHLORIDE SERPL-SCNC: 101 MMOL/L (ref 98–112)
CHLORIDE SERPL-SCNC: 101 MMOL/L (ref 98–112)
CO2 SERPL-SCNC: 25 MMOL/L (ref 21–32)
CO2 SERPL-SCNC: 28 MMOL/L (ref 21–32)
CREAT BLD-MCNC: 0.79 MG/DL
CREAT BLD-MCNC: 0.86 MG/DL
EGFRCR SERPLBLD CKD-EPI 2021: 67 ML/MIN/1.73M2 (ref 60–?)
EGFRCR SERPLBLD CKD-EPI 2021: 74 ML/MIN/1.73M2 (ref 60–?)
EOSINOPHIL # BLD AUTO: 0 X10(3) UL (ref 0–0.7)
EOSINOPHIL # BLD AUTO: 0.08 X10(3) UL (ref 0–0.7)
EOSINOPHIL NFR BLD AUTO: 0 %
EOSINOPHIL NFR BLD AUTO: 0.7 %
ERYTHROCYTE [DISTWIDTH] IN BLOOD BY AUTOMATED COUNT: 13.9 %
ERYTHROCYTE [DISTWIDTH] IN BLOOD BY AUTOMATED COUNT: 13.9 %
FLUAV + FLUBV RNA SPEC NAA+PROBE: NEGATIVE
FLUAV + FLUBV RNA SPEC NAA+PROBE: NEGATIVE
GLOBULIN PLAS-MCNC: 2.3 G/DL (ref 2–3.5)
GLOBULIN PLAS-MCNC: 2.8 G/DL (ref 2–3.5)
GLUCOSE BLD-MCNC: 154 MG/DL (ref 70–99)
GLUCOSE BLD-MCNC: 155 MG/DL (ref 70–99)
GLUCOSE BLD-MCNC: 173 MG/DL (ref 70–99)
GLUCOSE BLD-MCNC: 184 MG/DL (ref 70–99)
GLUCOSE BLD-MCNC: 207 MG/DL (ref 70–99)
GLUCOSE BLD-MCNC: 220 MG/DL (ref 70–99)
HCT VFR BLD AUTO: 37.4 %
HCT VFR BLD AUTO: 42.1 %
HGB BLD-MCNC: 12.3 G/DL
HGB BLD-MCNC: 13.7 G/DL
IMM GRANULOCYTES # BLD AUTO: 0.04 X10(3) UL (ref 0–1)
IMM GRANULOCYTES # BLD AUTO: 0.04 X10(3) UL (ref 0–1)
IMM GRANULOCYTES NFR BLD: 0.4 %
IMM GRANULOCYTES NFR BLD: 0.4 %
LYMPHOCYTES # BLD AUTO: 0.64 X10(3) UL (ref 1–4)
LYMPHOCYTES # BLD AUTO: 0.86 X10(3) UL (ref 1–4)
LYMPHOCYTES NFR BLD AUTO: 6.3 %
LYMPHOCYTES NFR BLD AUTO: 7.6 %
MAGNESIUM SERPL-MCNC: 1.5 MG/DL (ref 1.6–2.6)
MCH RBC QN AUTO: 27 PG (ref 26–34)
MCH RBC QN AUTO: 27.1 PG (ref 26–34)
MCHC RBC AUTO-ENTMCNC: 32.5 G/DL (ref 31–37)
MCHC RBC AUTO-ENTMCNC: 32.9 G/DL (ref 31–37)
MCV RBC AUTO: 82.4 FL
MCV RBC AUTO: 82.9 FL
MONOCYTES # BLD AUTO: 0.46 X10(3) UL (ref 0.1–1)
MONOCYTES # BLD AUTO: 0.59 X10(3) UL (ref 0.1–1)
MONOCYTES NFR BLD AUTO: 4.5 %
MONOCYTES NFR BLD AUTO: 5.2 %
NEUTROPHILS # BLD AUTO: 8.94 X10 (3) UL (ref 1.5–7.7)
NEUTROPHILS # BLD AUTO: 8.94 X10(3) UL (ref 1.5–7.7)
NEUTROPHILS # BLD AUTO: 9.72 X10 (3) UL (ref 1.5–7.7)
NEUTROPHILS # BLD AUTO: 9.72 X10(3) UL (ref 1.5–7.7)
NEUTROPHILS NFR BLD AUTO: 85.7 %
NEUTROPHILS NFR BLD AUTO: 88.5 %
NT-PROBNP SERPL-MCNC: 1652 PG/ML (ref ?–450)
OSMOLALITY SERPL CALC.SUM OF ELEC: 290 MOSM/KG (ref 275–295)
OSMOLALITY SERPL CALC.SUM OF ELEC: 294 MOSM/KG (ref 275–295)
P AXIS: 14 DEGREES
P-R INTERVAL: 168 MS
PLATELET # BLD AUTO: 195 10(3)UL (ref 150–450)
PLATELET # BLD AUTO: 200 10(3)UL (ref 150–450)
POTASSIUM SERPL-SCNC: 3.1 MMOL/L (ref 3.5–5.1)
POTASSIUM SERPL-SCNC: 3.6 MMOL/L (ref 3.5–5.1)
POTASSIUM SERPL-SCNC: 4.7 MMOL/L (ref 3.5–5.1)
PROT SERPL-MCNC: 6.6 G/DL (ref 5.7–8.2)
PROT SERPL-MCNC: 7.3 G/DL (ref 5.7–8.2)
Q-T INTERVAL: 390 MS
QRS DURATION: 66 MS
QTC CALCULATION (BEZET): 447 MS
R AXIS: 51 DEGREES
RBC # BLD AUTO: 4.54 X10(6)UL
RBC # BLD AUTO: 5.08 X10(6)UL
RSV RNA SPEC NAA+PROBE: NEGATIVE
SARS-COV-2 RNA RESP QL NAA+PROBE: NOT DETECTED
SODIUM SERPL-SCNC: 138 MMOL/L (ref 136–145)
SODIUM SERPL-SCNC: 139 MMOL/L (ref 136–145)
T AXIS: 60 DEGREES
TROPONIN I SERPL HS-MCNC: 15 NG/L
TROPONIN I SERPL HS-MCNC: 16 NG/L
VENTRICULAR RATE: 79 BPM
WBC # BLD AUTO: 10.1 X10(3) UL (ref 4–11)
WBC # BLD AUTO: 11.3 X10(3) UL (ref 4–11)

## 2025-02-07 PROCEDURE — 85025 COMPLETE CBC W/AUTO DIFF WBC: CPT | Performed by: EMERGENCY MEDICINE

## 2025-02-07 PROCEDURE — 99285 EMERGENCY DEPT VISIT HI MDM: CPT

## 2025-02-07 PROCEDURE — 0241U SARS-COV-2/FLU A AND B/RSV BY PCR (GENEXPERT): CPT | Performed by: EMERGENCY MEDICINE

## 2025-02-07 PROCEDURE — 5A09357 ASSISTANCE WITH RESPIRATORY VENTILATION, LESS THAN 24 CONSECUTIVE HOURS, CONTINUOUS POSITIVE AIRWAY PRESSURE: ICD-10-PCS | Performed by: STUDENT IN AN ORGANIZED HEALTH CARE EDUCATION/TRAINING PROGRAM

## 2025-02-07 PROCEDURE — 85025 COMPLETE CBC W/AUTO DIFF WBC: CPT | Performed by: NURSE PRACTITIONER

## 2025-02-07 PROCEDURE — 71045 X-RAY EXAM CHEST 1 VIEW: CPT

## 2025-02-07 PROCEDURE — 93306 TTE W/DOPPLER COMPLETE: CPT | Performed by: NURSE PRACTITIONER

## 2025-02-07 PROCEDURE — 80053 COMPREHEN METABOLIC PANEL: CPT | Performed by: NURSE PRACTITIONER

## 2025-02-07 PROCEDURE — 84132 ASSAY OF SERUM POTASSIUM: CPT | Performed by: INTERNAL MEDICINE

## 2025-02-07 PROCEDURE — 94760 N-INVAS EAR/PLS OXIMETRY 1: CPT

## 2025-02-07 PROCEDURE — 94799 UNLISTED PULMONARY SVC/PX: CPT

## 2025-02-07 PROCEDURE — 96376 TX/PRO/DX INJ SAME DRUG ADON: CPT

## 2025-02-07 PROCEDURE — 82962 GLUCOSE BLOOD TEST: CPT

## 2025-02-07 PROCEDURE — 96374 THER/PROPH/DIAG INJ IV PUSH: CPT

## 2025-02-07 PROCEDURE — 93005 ELECTROCARDIOGRAM TRACING: CPT

## 2025-02-07 PROCEDURE — 84484 ASSAY OF TROPONIN QUANT: CPT | Performed by: EMERGENCY MEDICINE

## 2025-02-07 PROCEDURE — 83880 ASSAY OF NATRIURETIC PEPTIDE: CPT | Performed by: EMERGENCY MEDICINE

## 2025-02-07 PROCEDURE — 87040 BLOOD CULTURE FOR BACTERIA: CPT | Performed by: STUDENT IN AN ORGANIZED HEALTH CARE EDUCATION/TRAINING PROGRAM

## 2025-02-07 PROCEDURE — 94640 AIRWAY INHALATION TREATMENT: CPT

## 2025-02-07 PROCEDURE — 94644 CONT INHLJ TX 1ST HOUR: CPT

## 2025-02-07 PROCEDURE — 36415 COLL VENOUS BLD VENIPUNCTURE: CPT

## 2025-02-07 PROCEDURE — 84484 ASSAY OF TROPONIN QUANT: CPT | Performed by: NURSE PRACTITIONER

## 2025-02-07 PROCEDURE — 93010 ELECTROCARDIOGRAM REPORT: CPT

## 2025-02-07 PROCEDURE — 94002 VENT MGMT INPAT INIT DAY: CPT

## 2025-02-07 PROCEDURE — 83735 ASSAY OF MAGNESIUM: CPT | Performed by: NURSE PRACTITIONER

## 2025-02-07 PROCEDURE — 80053 COMPREHEN METABOLIC PANEL: CPT | Performed by: EMERGENCY MEDICINE

## 2025-02-07 RX ORDER — ASPIRIN 81 MG/1
81 TABLET ORAL DAILY
Status: DISCONTINUED | OUTPATIENT
Start: 2025-02-07 | End: 2025-02-08

## 2025-02-07 RX ORDER — DONEPEZIL HYDROCHLORIDE 5 MG/1
10 TABLET, FILM COATED ORAL NIGHTLY
Status: DISCONTINUED | OUTPATIENT
Start: 2025-02-07 | End: 2025-02-08

## 2025-02-07 RX ORDER — AMLODIPINE BESYLATE 5 MG/1
5 TABLET ORAL DAILY
Status: DISCONTINUED | OUTPATIENT
Start: 2025-02-07 | End: 2025-02-08

## 2025-02-07 RX ORDER — FUROSEMIDE 10 MG/ML
40 INJECTION INTRAMUSCULAR; INTRAVENOUS ONCE
Status: COMPLETED | OUTPATIENT
Start: 2025-02-07 | End: 2025-02-07

## 2025-02-07 RX ORDER — METOPROLOL SUCCINATE 50 MG/1
50 TABLET, EXTENDED RELEASE ORAL DAILY
Status: DISCONTINUED | OUTPATIENT
Start: 2025-02-07 | End: 2025-02-08

## 2025-02-07 RX ORDER — PRAVASTATIN SODIUM 10 MG
10 TABLET ORAL NIGHTLY
COMMUNITY

## 2025-02-07 RX ORDER — FAMOTIDINE 20 MG/1
20 TABLET, FILM COATED ORAL 2 TIMES DAILY
Status: DISCONTINUED | OUTPATIENT
Start: 2025-02-07 | End: 2025-02-08

## 2025-02-07 RX ORDER — FUROSEMIDE 10 MG/ML
40 INJECTION INTRAMUSCULAR; INTRAVENOUS
Status: DISCONTINUED | OUTPATIENT
Start: 2025-02-07 | End: 2025-02-08

## 2025-02-07 RX ORDER — ALBUTEROL SULFATE 5 MG/ML
10 SOLUTION RESPIRATORY (INHALATION) ONCE
Status: COMPLETED | OUTPATIENT
Start: 2025-02-07 | End: 2025-02-07

## 2025-02-07 RX ORDER — HEPARIN SODIUM 5000 [USP'U]/ML
5000 INJECTION, SOLUTION INTRAVENOUS; SUBCUTANEOUS EVERY 8 HOURS SCHEDULED
Status: DISCONTINUED | OUTPATIENT
Start: 2025-02-07 | End: 2025-02-08

## 2025-02-07 RX ORDER — INSULIN DEGLUDEC 100 U/ML
5 INJECTION, SOLUTION SUBCUTANEOUS DAILY
Status: DISCONTINUED | OUTPATIENT
Start: 2025-02-07 | End: 2025-02-08

## 2025-02-07 RX ORDER — NITROFURANTOIN 25; 75 MG/1; MG/1
50 CAPSULE ORAL DAILY
COMMUNITY

## 2025-02-07 RX ORDER — ACETAMINOPHEN 500 MG
500 TABLET ORAL EVERY 4 HOURS PRN
Status: DISCONTINUED | OUTPATIENT
Start: 2025-02-07 | End: 2025-02-08

## 2025-02-07 RX ORDER — POTASSIUM CHLORIDE 1500 MG/1
40 TABLET, EXTENDED RELEASE ORAL EVERY 4 HOURS
Status: COMPLETED | OUTPATIENT
Start: 2025-02-07 | End: 2025-02-07

## 2025-02-07 RX ORDER — PRAVASTATIN SODIUM 10 MG
10 TABLET ORAL NIGHTLY
Status: DISCONTINUED | OUTPATIENT
Start: 2025-02-07 | End: 2025-02-08

## 2025-02-07 RX ORDER — MAGNESIUM OXIDE 400 MG/1
800 TABLET ORAL ONCE
Status: COMPLETED | OUTPATIENT
Start: 2025-02-07 | End: 2025-02-07

## 2025-02-07 RX ORDER — METFORMIN HYDROCHLORIDE 750 MG/1
750 TABLET, EXTENDED RELEASE ORAL
COMMUNITY

## 2025-02-07 NOTE — PLAN OF CARE
Assumed care of patient around 0730.Patient AXOX4.Weaned BIPAP off.On nasal cannula 2L,saturating above 92%. NSR on tele.No c/o pain.Remains on IV Lasix.Plan of care updated.PT/OT to see patient.dw,I/o.Call light within reach.  Problem: Diabetes/Glucose Control  Goal: Glucose maintained within prescribed range  Description: INTERVENTIONS:  - Monitor Blood Glucose as ordered  - Assess for signs and symptoms of hyperglycemia and hypoglycemia  - Administer ordered medications to maintain glucose within target range  - Assess barriers to adequate nutritional intake and initiate nutrition consult as needed  - Instruct patient on self management of diabetes  Outcome: Progressing     Problem: CARDIOVASCULAR - ADULT  Goal: Maintains optimal cardiac output and hemodynamic stability  Description: INTERVENTIONS:  - Monitor vital signs, rhythm, and trends  - Monitor for bleeding, hypotension and signs of decreased cardiac output  - Evaluate effectiveness of vasoactive medications to optimize hemodynamic stability  - Monitor arterial and/or venous puncture sites for bleeding and/or hematoma  - Assess quality of pulses, skin color and temperature  - Assess for signs of decreased coronary artery perfusion - ex. Angina  - Evaluate fluid balance, assess for edema, trend weights  Outcome: Progressing

## 2025-02-07 NOTE — CONSULTS
Heart Failure Nurse  Progress Note    Patient was evaluated by the Heart Failure Nurse  for understanding, verbalization, demonstration, and recall of education related to heart failure, overall adherence to the behaviors necessary to maintain a compensated status, and risk for readmission.     Patient assessment:Patient is alert, oriented x4. Daughter at bedside. Lives in assisted living at [facility]. Recently more stressed as  just had TAVR and has been being treated for bladder cancer this last year. Moved out of their home into AL. Patient was weighing about 3 times a week, but not daily. Menu is fixed as she is living at a facility but she does make smart choices around controlling glucose and sodium levels. Reinforced HF education to patient and daughter.     Patient is able to verbalize signs/symptoms fluid overload/impending HF exacerbation and who to contact with problems                                          _x__ yes  ___ no      Patient is following a 2000 mg sodium diet                                             x___ yes  ___ no    If no, barriers to 2000 mg sodium diet: Lives in assisted living, fixed menu     Patient informed of 2-Part dietician classes that is free if sign up within 30 days of discharge or $40  _x__ yes  ___ no      Patient is adherent to medication regimen                                              _x__ yes  ___ no    If no, barriers to medication regimen:    Patient has sufficient funds to purchase medication                      __x_ yes  ___ no      Patient has a scale in the home              _x__ yes  ___ no      Patient is adherent to daily weight monitoring                                        ___ yes   __x_ no    If no, barriers to daily weight monitoring:    Symptom Tracker Worksheet reviewed with patient  _x__ yes   ___ no      Patient verbalizes understanding of stoplight/heart failure zones          __x_ yes   ___ no      Patient understands  the importance of 7-day follow-up appointment      _x__ yes  ___ no    Appointment Date: will request          Patient has adequate transportation to attend follow-up appointments    _x__ yes  ___ no    If no, was referral to Social Work made  _x__yes  ___ no      Family/Friend present during education: Daughter    Additional consultations required: None    Bibiana CHAVISN, RN, PCCN  HF  [id number]

## 2025-02-07 NOTE — HISTORICAL OFFICE NOTE
Facility [facility]  [address], [address]th [place] [address]  [phone]      Guillermina Love  Progress Note  Demographics:  Name: Guillermina Love YOB: 1940  Age: 84, Female Medical Record No: [id number]  Visited Date/Time: 09/03/2024 08:50 AM    Chief Complaints  overdue follow up   stopped lasix and potassium  History of Present Illness  84 F w/ PMH of CAD s/p PCI, HTN, DLP, DM2.    Presented to [facility] care with cardiology October 2023. Reported history of coronary stents many years ago.  Recently was admitted for pneumonia.  echocardiogram showed preserved LVEF with severely enlarged atria.  It also showed aortic sclerosis without significant stenosis.  MCT showed a 1% A-fib.    Today presenting for teen follow-up.  Feels well overall.  Cardiac risk factors Hypertension and Current every day smoker  Past Medical History  1.Hypertension (HTN), primary  2.Pulmonary hypertension, not otherwise specified or secondary  3.CAD native (coronary artery disease)  4.Community acquired pneumonia, unspecified laterality  5.Acute on chronic congestive heart failure, unspecified heart failure type  6.Hypoxia  Family History  1. Father - Old MI (myocardial infarction)  2. Mother - Chronic lung disease  Social History  Smoking status Current every day smoker  Tobacco usage - Yes (Smoker (finding))  Review of systems  Cardiovascular No history of Chest pain, [place], Palpitations, Syncope, PND, Orthopnea, Edema and Claudication  Respiratory No history of SOB  Physical Examination  Vitals Right Arm Sitting  / 84 mmHg, Pulse rate 67 bpm, Height in 5' 0\", BMI: 29.7, Weight in 152 lbs (or) 68.95 kgs and BSA : 1.73 cc/m²  General Appearance Well groomed  Cardiovascular   EKG/Other abnormalities  Neck: Supple. No JVD.  CVS: RRR. Normal S1, S2. No S3, S4.  Soft LUC.  Respiratory: GAEB, no wheezing. No crackles.  Extremities: Pulses 2+ radial. No LE  edema.  Allergies  1.Azithromycin(Reaction:, Severity:Mild)  2.Ciprofloxacin(Reaction:, Severity:Mild)  3.Tetracycline(Reaction:, Severity:Mild)  Medications (Info obtained by: Verbal)  1.amLODIPine 5 MG Oral Tab, Take 1 tablet (5 mg total) by mouth daily.  2.aspirin 81 MG Oral Tab EC, Take 1 tablet (81 mg total) by mouth daily.  3.B Complex-C-Folic Acid (HM VITAMIN B COMPLEX/VITAMIN C) Oral Tab, Take by mouth.  4.Cholecalciferol (VITAMIN D) 1000 units Oral Tab, Take 1,000 Units by mouth.  5.Cranberry 425 MG Oral Cap, Take by mouth 2 (two) times daily.  6.dicyclomine 20 MG Oral Tab, Take 1 tablet (20 mg total) by mouth 3 (three) times daily as needed.  7.donepezil 10 MG Oral Tab, Take 1 tablet (10 mg total) by mouth daily.  8.hydrochlorothiazide 25 MG Oral Tab, Take 1 tablet (25 mg total) by mouth daily.  9.Lantus Solostar U-100 Insulin 100 unit/mL (3 mL) subcutaneous pen, as directed  10.Linagliptin (TRADJENTA) 5 MG Oral Tab, Take 1 tablet (5 mg total) by mouth.  11.loperamide 2 MG Oral Cap, Take 1 capsule (2 mg total) by mouth as needed for Diarrhea.  12.metFORMIN  mg tablet,extended release 24 hr, Take 1 tablet orally once a day.  13.metoprolol succinate ER 50 MG Oral Tablet 24 Hr, Take 1 tablet (50 mg total) by mouth daily.  14.multivitamin Oral Tab, Take 1 tablet by mouth daily.  15.Niacin ER 1000 MG Oral Tab CR, Take 1 tablet (1,000 mg total) by mouth nightly.  16.nitrofurantoin monohydrate macro 100 MG Oral Cap, Take 1 capsule (100 mg total) by mouth 2 (two) times daily.  17.Pantoprazole Sodium 20 MG Oral Tab EC, Take 1 tablet (20 mg total) by mouth every morning before breakfast.  18.pravastatin 10 mg tablet, Take 1 tablet orally once a day.  Impression  1.Hypertension (HTN), primary  2.Pulmonary hypertension, not otherwise specified or secondary  3.CAD native (coronary artery disease)  Assessment & Plan  # CAD  # HTN  # DLP  # DM2  # Aortic sclerosis without stenosis  # PAT  #  Smoking    MDM/Diagnostics:  -ECG (Oct 2023) NSR, PACs  -TTE (Oct 2023) LVEF 60%, sev dil LA, PASP 60, G2DD  -MCT (Nov 2023) 1% Afib, NSVT    Plan:  -LDL slightly above target.  Intolerant to statins in the past.  Declined high intensity statins.  Continue pravastatin 10 mg p.o. daily.  -Discussed results of MCT showing 1% A-fib.  Patient declined anticoagulation in the past.  -Optimal HTN control.  Continue amlodipine and HCTZ  -Continue aspirin  -Follow-up in 6 to 12 months  Labs and Diagnostics ordered  1.EKG (electrocardiogram) (Today)  Future appointments  1.Follow up visit - Sheridan Hunt MD (6 Months)  Miscellaneous  1.Tobacco Cessation reviewed with the patient (Smoking cessation therapy (regime/therapy))  2.Weight monitoring (regime/therapy)  Nurses documentation  Refills: none  Upcoming surgeries: none  Use of assistive device: none  EKG: yes- verbal order   (TIMOTHY WHITAKER)  Patient instructions  Medications:   1. Continue current medications.    Provider Follow Up:  1. Follow up with Dr. Hunt in 6-12 months     Please bring in you medication bottles or updated medicine list to your next appointment.  Call [facility] if you have any problems or concerns at [phone]  Diagnostics Details  ACTMonitoring 10/31/2023  1.This is an excellent quality study.    2.Predominant rhythm is normal sinus rhythm.    3.The minimum heart rate recorded was 56 beats / minute. The maximum heart rate is 106 beats / minute. The mean heart rate is 82 beats / minute.    4.First degree AV block noted.    5.Frequent premature atrial contractions noted.    6.No evidence of supraventricular tachycardia is noted.    7.Afib [place] 1%    8.Moderate premature ventricular contractions noted.    9.No evidence of ventricular tachycardia is noted.    10.No pauses were noted.    11.Created by: Mechelle Lin    12.NSVT noted. Short episodes of Afib    CPOE Orders carried out by: Victoria Chavez  Care Providers: Sheridan Hunt MD and Victoria  LaParry  Electronically Authenticated by  Sheridan Hunt MD  09/04/2024 06:40:47 PM  Disclaimer: Components of this note were documented using voice recognition system and are subject to errors not corrected at proofreading. Contact the author of this note for any clarifications.

## 2025-02-07 NOTE — H&P
[facility] Hospitalist History and Physical      Chief Complaint   Patient presents with    Difficulty Breathing        PCP: Trell Isabel      History of Present Illness: Patient is a 85 year old female with PMH sig for diastolic heart failure, CAD s/p PCI, hypertension, T2DM, hyperlipidemia, A-fib not on AC who presents for shortness of breath.  Patient states she developed shortness of breath 2 days ago which suddenly worsened around 2 AM this morning.  She currently lives in assisted living and her daughter was visiting, she subsequently called EMS.  Patient states she has been recommended to take diuretics however the patient has deferred.    Workup sent for elevated blood pressure, BNP 1652, creatinine 0.79.  Chest x-ray significant for interstitial edema, cardiomegaly, bilateral pleural effusions.  Patient placed on BiPAP and given IV Lasix in the ED.  Cardiology consulted.    Past Medical History:    Asthma (HCC)    CAD (coronary artery disease)    CHF (congestive heart failure) (HCC)    Diabetes (HCC)    Esophageal reflux    Essential hypertension    Hearing impairment    High blood pressure    Hyperlipidemia      Past Surgical History:   Procedure Laterality Date    Cath drug eluting stent      Hip fracture surgery Left     Removal gallbladder      Tonsillectomy          ALL:  Allergies[1]     No current outpatient medications on file.       Social History     Tobacco Use    Smoking status: Every Day     Current packs/day: 0.50     Types: Cigarettes    Smokeless tobacco: Current   Substance Use Topics    Alcohol use: Not Currently        Fam Hx  No family history on file.    Review of Systems  Comprehensive ROS reviewed and negative except for what is stated in HPI.      OBJECTIVE:  /59 (BP Location: Left arm)   Pulse 65   Temp 97.7 °F (36.5 °C) (Axillary)   Resp 20   Ht 5' 3\" (1.6 m)   Wt 155 lb 6.8 oz (70.5 kg)   SpO2 98%   BMI 27.53 kg/m²   Physical Exam:  General: Alert, awake,  cooperative.  HEENT:  Normocephalic, atraumatic.  Neck:  Trachea midline.  Neck supple.  Chest: Decreased breath sounds bilaterally. No wheezes, rales, or rhonchi.  CV:  Regular rate and rhythm.  Positive S1/S2. No murmur, no gallops, no rubs  GI: Bowel sounds present in all four quadrants, abdomen is soft, non-tender, non-distended.  Extremities: Trace lower extremity edema.  No cyanosis.  Neurological:  AAOx4.  Moving all extremities.  Skin:  Warm and dry.        Data Review:    LABS:   Lab Results   Component Value Date    WBC 10.1 02/07/2025    HGB 12.3 02/07/2025    HCT 37.4 02/07/2025    .0 02/07/2025    CREATSERUM 0.86 02/07/2025    BUN 11 02/07/2025     02/07/2025    K 3.1 02/07/2025     02/07/2025    CO2 25.0 02/07/2025     02/07/2025    CA 9.1 02/07/2025    ALB 4.3 02/07/2025    ALKPHO 60 02/07/2025    BILT 0.6 02/07/2025    TP 6.6 02/07/2025    AST 17 02/07/2025    ALT 9 02/07/2025    MG 1.5 02/07/2025    PGLU 207 02/07/2025       CXR: image personally reviewed.  interstitial edema, cardiomegaly, bilateral pleural effusions.    Radiology: XR CHEST AP/PA (1 VIEW) (CPT=71045)    Result Date: 2/7/2025  PROCEDURE:  XR CHEST AP/PA (1 VIEW) (CPT=71045)  TECHNIQUE:  AP chest radiograph was obtained.  COMPARISON:  [facility] , XR, XR CHEST AP PORTABLE  (CPT=71045), 10/15/2023, 6:51 PM.  INDICATIONS:  SOB, 72-73% RA  PATIENT STATED HISTORY: (As transcribed by Technologist)  Patient states woke up with difficulty breathing.    FINDINGS:  There is diffuse prominence of interstitial markings in lungs.  There is blunting of costophrenic angles bilaterally.  Findings could indicate edema.  There is cardiomegaly.  Aorta is atherosclerotic.  Chest wall structures are unremarkable.            CONCLUSION:  1. There are bilateral pleural effusions and there is diffuse prominence of interstitial markings which likely indicates edema. 2. There is cardiomegaly.  Preliminary report was reviewed and there  is no significant discrepancy.    LOCATION:  [facility]      Dictated by (CST): Serafin Pickard MD on 2/07/2025 at 7:19 AM     Finalized by (CST): Serafin Pickard MD on 2/07/2025 at 7:20 AM          Assessment/Plan:   85 year old female with PMH sig for diastolic heart failure, CAD s/p PCI, hypertension, T2DM, hyperlipidemia, A-fib not on AC who presents for shortness of breath.      #Stage C/NYHA III/HFpEF, acute on chronic  #Acute pulmonary edema  #Acute hypoxic respiratory failure requiring NIPPV  #Hypertensive urgency  -Continue IV diuresis  -Strict I's/O, daily weights, fluid restriction  -Trend renal function  -Wean BiPAP  -Echo  -Continue metoprolol and amlodipine  -Cardiology consulted    #Hypokalemia  #Hypomagnesemia  -Replete as appropriate    #A-fib  -Patient has deferred anticoagulation in the past  -Continue metoprolol    #CAD s/p PCI  -Continue aspirin, statin    #Hypertension  -amlodipine and metoprolol as above    #T2DM  -Degludec 5 units daily, SSI while inpatient  -Accu-Cheks    CODE STATUS: Full code  DVT prophylaxis: Heparin subcu    Outpatient records or previous hospital records reviewed.   [facility] hospitalist to continue to follow patient while in house  A total of 81 minutes taken with patient and coordinating care.  Greater than 50% face to face encounter.      Effie Giordano DO  [facility] Hospitalist      **Certification      PHYSICIAN Certification of Need for Inpatient Hospitalization - Initial Certification    Patient will require inpatient services that will reasonably be expected to span two midnight's based on the clinical documentation in H+P.   Based on patients current state of illness, I anticipate that, after discharge, patient will require TBD.         [1]   Allergies  Allergen Reactions    Crabs (Crustaceans) NAUSEA AND VOMITING    Azithromycin DIARRHEA    Ciprofloxacin NAUSEA AND VOMITING    Hydrochlorothiazide UNKNOWN    Penicillins UNKNOWN    Sulfa Antibiotics     Tetracycline

## 2025-02-07 NOTE — PLAN OF CARE
Guillermina Love Patient Status:  Emergency    1/15/1940 MRN [id number]   Location [facility] EMERGENCY DEPARTMENT Attending Fam Smith MD   Hosp Day # 0 PCP Trell Isabel     Cardiology Nocturnal APN Note    Briefly: (Documentation from chart review)     Guillermina Love is a 85 F  who presented with increasing dyspnea with exertion pulse ox at [facility] facility was 80% last night.  Woke up from sleep short of breath has slight cough no fever   Above taken from ED note and discussion with ED   PMH/PSH of:       Past Medical History:    Asthma (HCC)    CAD (coronary artery disease)    Diabetes (HCC)    Esophageal reflux    Essential hypertension    Hearing impairment    High blood pressure    Hyperlipidemia       [facility]     Vital Signs:       2025     4:15 AM 2025     5:00 AM   Vitals History   /70 152/80   Pulse 76 80   Resp 19 26   SpO2 100 % 96 %        Labs:   Lab Results   Component Value Date    WBC 11.3 2025    HGB 13.7 2025    HCT 42.1 2025    .0 2025    CREATSERUM 0.79 2025    BUN 10 2025     2025    K 3.6 2025     2025    CO2 28.0 2025     2025    CA 9.4 2025    ALB 4.5 2025    ALKPHO 64 2025    BILT 0.7 2025    TP 7.3 2025    AST 15 2025    ALT 8 2025    TROPHS 15 2025       Diagnostics:   No results found.    Allergies:  Allergies[1]    Medications:  No current facility-administered medications for this encounter.    Assessment:   EKG shows SR rate 79 peaked t waves no acute changes  Trop 15  pBNP 1652  K  3.6  creat 0.79  WBC 11.3 H&H 13.7/42.1   plts 200   CxR pending   H/o echo  EF 60-65% mild aortic stenosis and MR DD gr II pap 60  Lasix 80 mg given in ED   Patient was on bipap in ED dt o2 sats 73%           Plan:  Trend trops and check EKG if indicated  Echo this am  Cbc cmp and mag   CHF education   Strict I/O and  daily wts  Orders in sign & hold trops at 9  - Continue to monitor overnight  - Formal Cardiology consult to follow in AM.       Laure Butler NP  [facility]  2/7/2025  5:31 AM         [1]   Allergies  Allergen Reactions    Azithromycin DIARRHEA    Ciprofloxacin NAUSEA AND VOMITING    Hydrochlorothiazide UNKNOWN    Penicillins UNKNOWN    Sulfa Antibiotics     Tetracycline

## 2025-02-07 NOTE — DISCHARGE INSTRUCTIONS
Going Home Instructions  In this section you will find the tools which will guide you through the first few days after you leave the hospital. Continued use of these tools will help you develop the skills necessary to keep your heart failure under control.     Home Care Instructions Following Heart Failure - the most important things to do every day include:   Weigh yourself and review the “Self-Check Plan” sheet every morning.   Call your cardiologist office if you are in the “Pay Attention-Use Caution” (yellow zone) or “Medical Alert-Warning!” (red zone) as outlined in the Self-Check Plan sheet.  Take your medicines as prescribed.  Limit your sodium (salt) intake.  Know when to call your cardiologist, primary doctor, or nurse.  Know when to seek emergency care.      Things for You to Remember:   1. See your doctor or healthcare provider as written on your discharge instructions.  It is important that you attend this appointment to make sure your symptoms are under control.     2. Your recommended sodium intake is 7254-8280 mg daily.    3.  Weigh yourself every day.    4. Some exercise and activity is important to help keep your heart functioning and strong. Unless instructed not to exercise, you may walk at a slow to moderate pace for 10-15 minutes 2-3 days per week to start. Pace your activity to prevent shortness of breath or fatigue. Stop exercising if you develop chest pain, lightheadedness, or significant shortness of breath.       Call Your Cardiologist If:   You gain 2-3 pounds in one day or 5 pounds in one week.  You have more difficulty breathing.  You are getting more tired with normal activity.  You are more short of breath lying down, or awaken at night short of breath.  You have swelling of your feet or legs.  You urinate less often during the day and more often at night.  You have cramps in your legs.  You have blurred vision or see yellowish-green halos around objects of lights.    Go to the  Emergency Room If:   You have pain or tightness in your chest  You are extremely short of breath  You are coughing up pink-frothy mucus  You are traveling and develop symptoms of worsening heart failure      ** Please follow up with your cardiologist or Advanced Practice Provider as written on your discharge instructions. If you are not provided with an appointment, let your nurse know so you can get an appointment**

## 2025-02-07 NOTE — ED INITIAL ASSESSMENT (HPI)
Pt presents to ED for SOB from assisted living. Woke up 2am and couldn't breath. Nurse check O2. 80% on RA. Upon triage, 75% on RA. Now 98% on 6 L NC. Denies fevers and CP. Current smoker.

## 2025-02-07 NOTE — PROGRESS NOTES
Assumed care of patient from ER ~0600. Patient oriented to room and call light. Patient AOX4. Satting >90% on continuous bi-pap as ordered. NSR on tele with frequent PAC's. VSS. Complains of leg cramping, relieved with PRN Tylenol, warm compresses. Replacing electrolytes per protocol. Minimal urine output this far on following Lasix, external urinary catheter in place. Skin intact, 2 person skin check completed with PCT. Cardiology consult to see. On call [facility] hospitalist notified of patient's admission to unit and completed of PTA med list. Patient and daughters at bedside updated on plan of care.

## 2025-02-07 NOTE — CONSULTS
[facility]  Consultation    Guillermina Love Patient Status:  Inpatient    1/15/1940 MRN [id number]   Location [facility] 8NE-A Attending Effie Giordano,    Hosp Day # 0 PCP Trell Isabel       Reason for consultation;  CHF     HPI:  This is an 85 year old female patient with PMH of  CAD s/p PCI, HTN, DLP, DM2, PAF.    She presented with two days of progressive SOB, and PND. She denies any LE edema.    She has similar symptoms in the past, but was resistant to taking diuretics. She was also advised to take AC for Afib but declined.    Currently feels better after receiving lasix.    MDM / Diagnostics reviewed & interpreted:  ECG shows NSR, PACs, possible LVH  Trp negative x 2  Pro-BNP 1652  CXR w/ pulmonary edema    Assessment:    CHF  CAD s/p PCI  HTN  DLP  DM2  PAF    Plan:  -Volume overloaded. Requiring oxygen. Start lasix 40mg IV BID. Suspect will require diuresis over the weekend, then transition to PO lasix eventually  -monitor Cr, Uoutput, I/Os, O2 requirements  -declined DOAC for Afib in the past. Currently maintaining NSR.  -continue home cardiac meds    Discussed with patient.     Please call with questions. Thank you for your consult.    Level of care: GOSIA Hunt MD  Interventional Cardiology  [facility]    --------------------------------------------------------------------------------------------------------------------------------  ROS 10 systems reviewed, pertinent findings above.    History:  Past Medical History:    Asthma (HCC)    CAD (coronary artery disease)    CHF (congestive heart failure) (HCC)    Diabetes (HCC)    Esophageal reflux    Essential hypertension    Hearing impairment    High blood pressure    Hyperlipidemia     Past Surgical History:   Procedure Laterality Date    Cath drug eluting stent      Hip fracture surgery Left     Removal gallbladder      Tonsillectomy       No family history on file.   reports that she has been smoking  cigarettes. She uses smokeless tobacco. She reports that she does not currently use alcohol. She reports that she does not use drugs.    Objective:   Temp: 97.7 °F (36.5 °C)  Pulse: 65  Resp: 20  BP: 129/59  FiO2 (%): 30 %    Intake/Output:     Intake/Output Summary (Last 24 hours) at 2/7/2025 1059  Last data filed at 2/7/2025 0837  Gross per 24 hour   Intake 50 ml   Output 300 ml   Net -250 ml       Physical Exam:     General: NAD. Conversant.   HEENT: Normocephalic, atraumatic.  Neck: Supple.  Cardiac: RRR. Normal S1, S2 . No murmur.  Lungs: Diminished  GI: Soft, non-distended  Extremities: Rt radial pulses 2+. No edema.  Skin: Warm and dry.      Sheridan Hunt MD  2/7/2025  10:59 AM

## 2025-02-07 NOTE — ED QUICK NOTES
Orders for admission, patient is aware of plan and ready to go upstairs. Any questions, please call ED RN Bonita at extension [phone].     Patient Covid vaccination status: Fully vaccinated     COVID Test Ordered in ED: SARS-CoV-2/Flu A and B/RSV by PCR (GeneXpert)    COVID Suspicion at Admission: N/A    Running Infusions:  None    Mental Status/LOC at time of transport: A&Ox4    Other pertinent information: Daughters at bedside. All consults aware.    CIWA score: N/A   NIH score:  N/A

## 2025-02-07 NOTE — ED PROVIDER NOTES
Patient Seen in: [facility] Emergency Department      History     Chief Complaint   Patient presents with    Difficulty Breathing     Stated Complaint: SOB, 72-73% RA    Subjective:   HPI      Patient is an 85-year-old female presents to ED for evaluation of shortness of breath.  Patient states she started getting short of breath yesterday morning.  Spoke with her daughter and states she was short of breath yesterday morning for short time.  She was more okay the rest of the day but was getting slight shortness of breath when walking around.  Denies chest pain.  Daughter states her pulse ox at assisted-living facility was 80% tonight she was more short of breath that woke her up from sleep.  She has had a slight cough.  No fever.  Patient is still a smoker.  Asthma is listed in her records but patient denies any history of respiratory problems.  Patient denies any other complaints.    Objective:     No pertinent past medical history.            No pertinent past surgical history.              No pertinent social history.                Physical Exam     ED Triage Vitals   BP 02/07/25 0315 (!) 187/99   Pulse 02/07/25 0315 80   Resp 02/07/25 0315 (!) 27   Temp 02/07/25 0325 97.8 °F (36.6 °C)   Temp src 02/07/25 0325 Temporal   SpO2 02/07/25 0315 98 %   O2 Device 02/07/25 0315 Nasal cannula       Current Vitals:   Vital Signs  BP: 152/80  Pulse: 80  Resp: 26  Temp: 97.8 °F (36.6 °C)  Temp src: Temporal  MAP (mmHg): 97    Oxygen Therapy  SpO2: 96 %  O2 Device: Bi-PAP  Mode: Spontaneous/Timed  O2 Flow Rate (L/min): 5 L/min        Physical Exam  GENERAL: Patient taking shallow breaths with respiratory rate in the mid 20s  HEENT: Normocephalic, atraumatic.  Moist mucous membranes.  Pupils equal round reactive to light and accommodation, extraocular motion is intact, sclerae white, conjunctiva is pink.  Oropharynx is unremarkable, no exudate.  NECK: Supple, trachea midline, no lymphadenopathy.  Patient does have  JVD  LUNG: Diminished breath sounds throughout with crackles at the bases  CARDIOVASCULAR: Regular rate and rhythm.  Normal S1S2.  No S3S4 or murmur.  ABDOMEN: Bowel sounds are present. Soft. nondistended, no pulsatile masses. nontender  MUSCULOSKELETAL: No calf tenderness.  Dorsalis and Posterior Tibial pulses present. No clubbing. No cyanosis.  1+ lower extremity edema  SKIN EXAMINATIoN: Warm and dry with normal appearance.  No rashes or lesions.  NEUROLOGICAL:  Motor strength intact all groups.  normal sensation, speech intact    ED Course     Labs Reviewed   CBC WITH DIFFERENTIAL WITH PLATELET - Abnormal; Notable for the following components:       Result Value    WBC 11.3 (*)     Neutrophil Absolute Prelim 9.72 (*)     Neutrophil Absolute 9.72 (*)     Lymphocyte Absolute 0.86 (*)     All other components within normal limits   COMP METABOLIC PANEL (14) - Abnormal; Notable for the following components:    Glucose 184 (*)     ALT 8 (*)     All other components within normal limits   PRO BETA NATRIURETIC PEPTIDE - Abnormal; Notable for the following components:    Pro-Beta Natriuretic Peptide 1,652 (*)     All other components within normal limits   TROPONIN I HIGH SENSITIVITY - Normal   SARS-COV-2/FLU A AND B/RSV BY PCR ([place] PowerPERT) - Normal    Narrative:     This test is intended for the qualitative detection and differentiation of SARS-CoV-2, influenza A, influenza B, and respiratory syncytial virus (RSV) viral RNA in nasopharyngeal or nares swabs from individuals suspected of respiratory viral infection consistent with COVID-19 by their healthcare provider. Signs and symptoms of respiratory viral infection due to SARS-CoV-2, influenza, and RSV can be similar.    Test performed using the Xpert Xpress SARS-CoV-2/FLU/RSV (real time RT-PCR)  assay on the [employer] Medical[employer] instrument, [employer], [place] [address].   This test is being used under the Food and Drug Administration's Emergency Use Authorization.    The  authorized Fact Sheet for Healthcare Providers for this assay is available upon request from the laboratory.   RAINBOW DRAW LAVENDER   RAINBOW DRAW LIGHT GREEN   RAINBOW DRAW BLUE   BLOOD CULTURE   BLOOD CULTURE   BNP 1652.  White blood cell count 11.3  EKG    Rate, intervals and axes as noted on EKG Report.  Rate: 79  Rhythm: Sinus Rhythm  Reading: Sinus arrhythmia.  No acute changes                I personally reviewed xray films of chest and independent interpretation shows pulmonary edema.  I also reviewed formal xray report as read by radiology with findings below:    Xray of chest read by vision rad radiology shows mild pulmonary edema    Medications   acetaminophen (Tylenol Extra Strength) tab 500 mg (has no administration in time range)   heparin (Porcine) 5000 UNIT/ML injection 5,000 Units (has no administration in time range)   albuterol (Ventolin) (5 MG/ML) 0.5% nebulizer solution 10 mg (10 mg Nebulization Given 2/7/25 0343)   ipratropium (Atrovent) 0.02 % nebulizer solution 1 mg (1 mg Nebulization Given 2/7/25 0343)   furosemide (Lasix) 10 mg/mL injection 40 mg (40 mg Intravenous Given 2/7/25 0341)   furosemide (Lasix) 10 mg/mL injection 40 mg (40 mg Intravenous Given 2/7/25 0503)          MDM      Patient is an 85-year-old female presents to ED for evaluation of shortness of breath.  Differential ACS, pulmonary edema, pneumonia.  Patient with elevated respiratory rate and hypoxia.  Placed on oxygen.  Chest x-ray immediately performed showing pulmonary edema.  Respiratory contacted and they placed her on BiPAP 12/6.  She was given Lasix 40 mg IV followed by another 40 mg IV after 1 hour.  She is diuresing.  Laboratory test revealed elevated BNP with normal troponin.  EKG shows sinus arrhythmia.  Patient symptoms consistent with pulmonary edema.  Will require admission to CTU.  Case discussed with [facility] nurse practitioner as well as hospitalist.  Critical care time was 35  minutes. This critical care time is exclusive of procedures critical care time includes monitoring of patient's cardiopulmonary and hemodynamic status, interpretation of laboratory values, and discussion of case with physician and consultants.  Echocardiogram from 2023 shows diastolic dysfunction, mild aortic stenosis.  Patient will require repeat echocardiogram.  Admission disposition: 2/7/2025  4:15 AM         External and old record review was performed.  I reviewed echocardiogram 2023 showing  1. Left ventricle: The cavity size was normal. Wall thickness was normal.      Systolic function was normal. The estimated ejection fraction was 60-65%.      Features are consistent with a pseudonormal left ventricular filling      pattern, with concomitant abnormal relaxation and increased filling      pressure - grade 2 diastolic dysfunction.   2. Left atrium: The atrium was markedly dilated.   3. Right atrium: The atrium was markedly dilated.   4. Aortic valve: The valve was trileaflet. The leaflets were mildly      thickened and mildly calcified. Cusp separation was mildly reduced.      Transvalvular velocity was increased, due to stenosis. The findings were      consistent with mild stenosis. The peak systolic velocity was 2.05m/sec.      The mean systolic gradient was 8mm Hg. The valve area (VTI) was 1.41cm^2.      The valve area (VTI) index was 0.83cm^2/m^2.   5. Mitral valve: There was mild regurgitation. The mean diastolic gradient      was 3mm Hg.   6. Pulmonary arteries: Systolic pressure was moderately to markedly      increased. The peak systolic pressure is 60mm Hg.   7. Pericardium, extracardiac: There was a left pleural effusion.   Impressions:  No previous study was available for comparison.       Medical Decision Making      Disposition and Plan     Clinical Impression:  1. Acute on chronic systolic congestive heart failure (HCC)         Disposition:  Admit  2/7/2025  4:15 am    Follow-up:  No follow-up  provider specified.        Medications Prescribed:  Current Discharge Medication List              Supplementary Documentation:         Hospital Problems       Present on Admission  Date Reviewed: 4/22/2024            ICD-10-CM Noted POA    * (Principal) Acute on chronic systolic congestive heart failure (HCC) I50.23 2/7/2025 Unknown

## 2025-02-08 LAB
ANION GAP SERPL CALC-SCNC: 8 MMOL/L (ref 0–18)
BUN BLD-MCNC: 9 MG/DL (ref 9–23)
CALCIUM BLD-MCNC: 9.4 MG/DL (ref 8.7–10.6)
CHLORIDE SERPL-SCNC: 99 MMOL/L (ref 98–112)
CO2 SERPL-SCNC: 31 MMOL/L (ref 21–32)
CREAT BLD-MCNC: 0.83 MG/DL
EGFRCR SERPLBLD CKD-EPI 2021: 69 ML/MIN/1.73M2 (ref 60–?)
ERYTHROCYTE [DISTWIDTH] IN BLOOD BY AUTOMATED COUNT: 13.8 %
GLUCOSE BLD-MCNC: 132 MG/DL (ref 70–99)
GLUCOSE BLD-MCNC: 154 MG/DL (ref 70–99)
GLUCOSE BLD-MCNC: 158 MG/DL (ref 70–99)
HCT VFR BLD AUTO: 39.5 %
HGB BLD-MCNC: 13.1 G/DL
MAGNESIUM SERPL-MCNC: 1.7 MG/DL (ref 1.6–2.6)
MCH RBC QN AUTO: 27.1 PG (ref 26–34)
MCHC RBC AUTO-ENTMCNC: 33.2 G/DL (ref 31–37)
MCV RBC AUTO: 81.6 FL
OSMOLALITY SERPL CALC.SUM OF ELEC: 288 MOSM/KG (ref 275–295)
PLATELET # BLD AUTO: 189 10(3)UL (ref 150–450)
POTASSIUM SERPL-SCNC: 3.6 MMOL/L (ref 3.5–5.1)
RBC # BLD AUTO: 4.84 X10(6)UL
SODIUM SERPL-SCNC: 138 MMOL/L (ref 136–145)
WBC # BLD AUTO: 8.1 X10(3) UL (ref 4–11)

## 2025-02-08 PROCEDURE — 82962 GLUCOSE BLOOD TEST: CPT

## 2025-02-08 PROCEDURE — 97530 THERAPEUTIC ACTIVITIES: CPT

## 2025-02-08 PROCEDURE — 83735 ASSAY OF MAGNESIUM: CPT | Performed by: STUDENT IN AN ORGANIZED HEALTH CARE EDUCATION/TRAINING PROGRAM

## 2025-02-08 PROCEDURE — 97535 SELF CARE MNGMENT TRAINING: CPT

## 2025-02-08 PROCEDURE — 85027 COMPLETE CBC AUTOMATED: CPT | Performed by: STUDENT IN AN ORGANIZED HEALTH CARE EDUCATION/TRAINING PROGRAM

## 2025-02-08 PROCEDURE — 80048 BASIC METABOLIC PNL TOTAL CA: CPT | Performed by: STUDENT IN AN ORGANIZED HEALTH CARE EDUCATION/TRAINING PROGRAM

## 2025-02-08 PROCEDURE — 97165 OT EVAL LOW COMPLEX 30 MIN: CPT

## 2025-02-08 PROCEDURE — 97116 GAIT TRAINING THERAPY: CPT

## 2025-02-08 PROCEDURE — 97161 PT EVAL LOW COMPLEX 20 MIN: CPT

## 2025-02-08 PROCEDURE — 94760 N-INVAS EAR/PLS OXIMETRY 1: CPT

## 2025-02-08 RX ORDER — FUROSEMIDE 40 MG/1
40 TABLET ORAL DAILY
Status: DISCONTINUED | OUTPATIENT
Start: 2025-02-08 | End: 2025-02-08

## 2025-02-08 RX ORDER — POTASSIUM CHLORIDE 1500 MG/1
40 TABLET, EXTENDED RELEASE ORAL EVERY 4 HOURS
Status: COMPLETED | OUTPATIENT
Start: 2025-02-08 | End: 2025-02-08

## 2025-02-08 RX ORDER — MAGNESIUM OXIDE 400 MG/1
400 TABLET ORAL ONCE
Status: COMPLETED | OUTPATIENT
Start: 2025-02-08 | End: 2025-02-08

## 2025-02-08 RX ORDER — FUROSEMIDE 40 MG/1
40 TABLET ORAL DAILY
Qty: 30 TABLET | Refills: 1 | Status: SHIPPED | OUTPATIENT
Start: 2025-02-09

## 2025-02-08 NOTE — PHYSICAL THERAPY NOTE
PHYSICAL THERAPY EVALUATION - INPATIENT     Room Number: 8614/8614-A  Evaluation Date: 2025  Type of Evaluation: Initial  Physician Order: PT Eval and Treat    Presenting Problem: acute on chronic CHF  Co-Morbidities : diastolic heart failure, CAD s/p PCI, HTN, T2DM, HLD, a-fib  Reason for Therapy: Mobility Dysfunction and Discharge Planning    PHYSICAL THERAPY ASSESSMENT   Patient is a 85 year old female admitted 2025 for acute on chronic congestive heart failure.   Patient is currently functioning near baseline with bed mobility, transfers, and gait. Prior to admission, patient's baseline is Mod I for ambulation with rollator and assist with ADL.     Patient will benefit from continued skilled PT Services for duration of hospitalization, however, given the patient is functioning near baseline level do not anticipate skilled therapy needs at discharge .    PLAN  Patient has been evaluated and presents with no skilled Physical Therapy needs at this time.  Patient discharged from Physical Therapy services.  Please re-order if a new functional limitation presents during this admission.    PT Device Recommendation: Rollator    GOALS  Patient was able to achieve the following goals ...    Patient was able to transfer At previous, functional level  Safely and Mod I   Patient able to ambulate on level surfaces At previous, functional level  Safely and Mod I     HOME SITUATION  Type of Home: Assisted living facility ([facility])  Home Layout: One level                     Lives With: Spouse;Staff 24 hours    Drives: No   Patient Regularly Uses: Glasses     Prior Level of [place]: Pt uses a rollator, has assist with ADL    SUBJECTIVE  Pt pleasant and cooperative.     OBJECTIVE  Precautions: Bed/chair alarm  Fall Risk: Standard fall risk    WEIGHT BEARING RESTRICTION     PAIN ASSESSMENT  Ratin  Location: denies pain       COGNITION  Overall Cognitive Status:  WFL - within functional limits    RANGE OF  MOTION AND STRENGTH ASSESSMENT  Upper extremity ROM and strength are within functional limits     Lower extremity ROM is within functional limits     Lower extremity strength is within functional limits     BALANCE  Static Sitting: Good  Dynamic Sitting: Fair +  Static Standing: Poor +  Dynamic Standing: Poor    ADDITIONAL TESTS                                    ACTIVITY TOLERANCE                         O2 WALK  Oxygen Therapy  SPO2% on Room Air at Rest: 98    NEUROLOGICAL FINDINGS                        AM-PAC '6-Clicks' INPATIENT SHORT FORM - BASIC MOBILITY  How much difficulty does the patient currently have...  Patient Difficulty: Turning over in bed (including adjusting bedclothes, sheets and blankets)?: None   Patient Difficulty: Sitting down on and standing up from a chair with arms (e.g., wheelchair, bedside commode, etc.): None   Patient Difficulty: Moving from lying on back to sitting on the side of the bed?: None   How much help from another person does the patient currently need...   Help from Another: Moving to and from a bed to a chair (including a wheelchair)?: None   Help from Another: Need to walk in hospital room?: None   Help from Another: Climbing 3-5 steps with a railing?: A Little       AM-PAC Score:  Raw Score: 23   Approx Degree of Impairment: 11.2%   Standardized Score (AM-PAC Scale): 56.93   CMS Modifier (G-Code): CI    FUNCTIONAL ABILITY STATUS  Gait Assessment   Functional Mobility/Gait Assessment  Gait Assistance: Modified independent  Distance (ft): 250  Assistive Device: Rolling walker  Pattern: Shuffle    Skilled Therapy Provided     Bed Mobility:  Rolling: NT  Supine to sit: NT   Sit to supine: NT     Transfer Mobility:  Sit to stand: Mod I   Stand to sit: Mod I  Gait = Mod I with RW    Therapist's comments:PT orders received, chart reviewed, and RN approved PT session. Pt agreeable to PT. Vitals assessed and WNL. Attempted to assess bed mobility, but pt declined, stating \"I have  no problems with that.\" Pt ambulated with RW to the bathroom, performed toileting activity with OT, required inc time to complete. Pt cued during ambulation for keeping straight during walking and to keep hands on RW at all times. PT instructed pt in proper integration of RW, to keep RW within NUZHAT, instead of pushing it out too far in front. Pt returned to room, left in chair, alarm on. All needs in reach. MD at chairside. RN notified.     Exercise/Education Provided:  Energy conservation  Functional activity tolerated  Gait training  Transfer training    Patient End of Session: Up in chair;Needs met;With  staff;Call light within reach;RN aware of session/findings;All patient questions and concerns addressed;Hospital anti-slip socks;Alarm set    Patient Evaluation Complexity Level:  History High - 3 or more personal factors and/or co-morbidities   Examination of body systems High - addressing a total of 4 or more elements   Clinical Presentation Low- Stable   Clinical Decision Making Low Complexity       PT Session Time: 25 minutes  Gait Training: 10 minutes  Therapeutic Activity: 0 minutes  Neuromuscular Re-education: 0 minutes  Therapeutic Exercise: 0 minutes

## 2025-02-08 NOTE — PLAN OF CARE
1505 - report called to Hattie at [facility]    Pt discharged to [facility]. IV removed. Tele dc'd and returned to monitor tech. Follow up instructions provided and discussed. Pt and family verbalized understanding. Rx scripts given. Discussed adverse reactions and side effects of all new medications and provided appropriate handouts. Pt and family verbalized understanding. Pt wheeled down by staff with all belongings. Pt left denying complaints of pain, malaise, or cardiac symptoms. All needs met by staff.

## 2025-02-08 NOTE — CM/SW NOTE
02/07/25 1700   CM/ Referral Data   Referral Source Social Work (self-referral)   Reason for Referral Discharge planning   Informant EMR;Patient;Daughter   Medical Hx   Does patient have an established PCP? Yes  ([facility])   Significant Past Medical/Mental Health Hx DM, asthma, HTN, CHF   Patient Info   Advanced directives? Yes   Patient's Current Mental Status at Time of Assessment Alert   Patient's Home Environment Assisted Living   Post Acute Care Provider Upon Admission Other  ([facility])   Number of Levels in Home 1   Patient lives with Spouse/Significant other   Patient Status Prior to Admission   Independent with ADLs and Mobility No   Pt. requires assistance with Driving;Bathing;Housework   Discharge Needs   Anticipated D/C needs To be determined     CM met w/ pt and dtr to assess her home environment and identify DC needs. Pt is a resident at [facility] with her sps. Pt gets assistance for bath/showers, laundry and cleaning, medication management, and needs min assist for dressing. Pt reports \"I need help with my socks.\" Pt is currently needing 2L/NC o2, which she does not typically use. D/w pt and dtr that CM will follow for both her oxygen needs and PT eval. Pt denies concerns about returning to [facility], states \"I wish I could go home today.\"     Pt/dtr encouraged to reach out with questions or concerns.     RASHEL Hodgson, CMSRN    [id number]    
   02/08/25 1200   Discharge disposition   Expected discharge disposition [facility]     Pt cleared to discharge back to [facility]. SW placed call to facility to inform of return. Spoke with Vikki  - she will forward information to RN. Anticipate discharge around 3:45-4:00pm. Awaiting to see if family will transport pt back or if transport is needed.     RN to call report at dc: [phone]    Adrianna MONREAL, LSW  Discharge Planner      
---

## 2025-02-08 NOTE — PLAN OF CARE
PT A&OX4, on 2L NC  NSR on tele.  No cough reported  Denied CP. Denied SOB. Complaint of light headache, requested PRN Tylenol  X1 walker  Continent x2 purewick in place due to increase frequency  Updated POC to pt, all needs meet at this time.   Call light within reach, bed alarm on for pt safety.     POC:   IV Lasix  PT/OT to see    Problem: Diabetes/Glucose Control  Goal: Glucose maintained within prescribed range  Description: INTERVENTIONS:  - Monitor Blood Glucose as ordered  - Assess for signs and symptoms of hyperglycemia and hypoglycemia  - Administer ordered medications to maintain glucose within target range  - Assess barriers to adequate nutritional intake and initiate nutrition consult as needed  - Instruct patient on self management of diabetes  Outcome: Progressing     Problem: Patient/Family Goals  Goal: Patient/Family Long Term Goal  Description: Patient's Long Term Goal: stay out of hosp    Interventions:  - Follow up MD  - See additional Care Plan goals for specific interventions  Outcome: Progressing  Goal: Patient/Family Short Term Goal  Description: Patient's Short Term Goal: Go home     Interventions:   - ECHO/ LASIX/ PT/OT eval  - See additional Care Plan goals for specific interventions  Outcome: Progressing     Problem: CARDIOVASCULAR - ADULT  Goal: Maintains optimal cardiac output and hemodynamic stability  Description: INTERVENTIONS:  - Monitor vital signs, rhythm, and trends  - Monitor for bleeding, hypotension and signs of decreased cardiac output  - Evaluate effectiveness of vasoactive medications to optimize hemodynamic stability  - Monitor arterial and/or venous puncture sites for bleeding and/or hematoma  - Assess quality of pulses, skin color and temperature  - Assess for signs of decreased coronary artery perfusion - ex. Angina  - Evaluate fluid balance, assess for edema, trend weights  Outcome: Progressing  Goal: Absence of cardiac arrhythmias or at baseline  Description:  INTERVENTIONS:  - Continuous cardiac monitoring, monitor vital signs, obtain 12 lead EKG if indicated  - Evaluate effectiveness of antiarrhythmic and heart rate control medications as ordered  - Initiate emergency measures for life threatening arrhythmias  - Monitor electrolytes and administer replacement therapy as ordered  Outcome: Progressing

## 2025-02-08 NOTE — PLAN OF CARE
Assumed care at 0730. Pt alert, oriented x4. Weaned to room air, oxygen saturation adequate on room air. Lung sounds diminished bilaterally. Tele: NSR. Continent.Denies pain. Ambulates x1 walker. Plan of care: QID, PT/OT on consult, discharge today pending clearance from all consults. Pt updated on plan of care. Questions answered.     Problem: Diabetes/Glucose Control  Goal: Glucose maintained within prescribed range  Description: INTERVENTIONS:  - Monitor Blood Glucose as ordered  - Assess for signs and symptoms of hyperglycemia and hypoglycemia  - Administer ordered medications to maintain glucose within target range  - Assess barriers to adequate nutritional intake and initiate nutrition consult as needed  - Instruct patient on self management of diabetes  Outcome: Progressing     Problem: CARDIOVASCULAR - ADULT  Goal: Maintains optimal cardiac output and hemodynamic stability  Description: INTERVENTIONS:  - Monitor vital signs, rhythm, and trends  - Monitor for bleeding, hypotension and signs of decreased cardiac output  - Evaluate effectiveness of vasoactive medications to optimize hemodynamic stability  - Monitor arterial and/or venous puncture sites for bleeding and/or hematoma  - Assess quality of pulses, skin color and temperature  - Assess for signs of decreased coronary artery perfusion - ex. Angina  - Evaluate fluid balance, assess for edema, trend weights  Outcome: Progressing  Goal: Absence of cardiac arrhythmias or at baseline  Description: INTERVENTIONS:  - Continuous cardiac monitoring, monitor vital signs, obtain 12 lead EKG if indicated  - Evaluate effectiveness of antiarrhythmic and heart rate control medications as ordered  - Initiate emergency measures for life threatening arrhythmias  - Monitor electrolytes and administer replacement therapy as ordered  Outcome: Progressing

## 2025-02-08 NOTE — OCCUPATIONAL THERAPY NOTE
OCCUPATIONAL THERAPY EVALUATION - INPATIENT    Room Number: 8614/8614-A  Evaluation Date: 2/8/2025     Type of Evaluation: Initial  Presenting Problem: CHF exacerbation    Physician Order: IP Consult to Occupational Therapy  Reason for Therapy:  ADL/IADL Dysfunction and Discharge Planning      OCCUPATIONAL THERAPY ASSESSMENT   Patient met all OT goals near baseline level.    Patient reports no further questions/concerns at this time.   Discharge OT in hospital. Recommend  OT for energy conservation and building endurance in daily routine.          History: Patient is a 85 year old female admitted on 2/7/2025 with Presenting Problem: CHF exacerbation. Co-Morbidities : diastolic heart failure, CAD s/p PCI, HTN, T2DM, HLD, a-fib    WEIGHT BEARING RESTRICTION                   Recommendations for nursing staff:   Transfers: CGA with RW  Toileting location: Toilet    EVALUATION SESSION:  ACTIVITY TOLERANCE   Vitals wfl on RA, no C/o SOB    COGNITION  Safety Judgement:  decreased awareness of need for safety  Awareness of Errors:  assistance required to identify errors made and assistance required to correct errors made    UPPER EXTREMITY:   ROM: within functional limits   Strength: is within functional limits     EDUCATION PROVIDED  Patient Education : Role of Occupational Therapy; Plan of Care; Functional Transfer Techniques  Patient's Response to Education: Verbalized Understanding    PATIENT START OF SESSION: seated  FUNCTIONAL TRANSFER ASSESSMENT  Sit to Stand: Chair  Chair: Supervision  Toilet Transfer: Supervision    BED MOBILITY     BALANCE ASSESSMENT     FUNCTIONAL ADL ASSESSMENT  Grooming Standing: Supervision (cueing to wash hands after posterior pravin care)  LB Dressing Seated: Moderate Assist (assist to don/doff socks (baseline), dons B slip on shoes with setup, assist to thread BLE into underpants (of note, shoes already on and making task more challenging))  Toileting Seated: Supervision      EQUIPMENT  USED: RW, grab bar  Demonstrates functional use    THERAPIST COMMENTS: ADL/mobility as noted. Assist for socks (baseline), dons shoes with setup. Pt with bowel movement followed by supervision level pravin care; completed 3x. Reports this is due to IBS. Dons underpants with min (A) to manage over shoes, then SBA. Reminder needed to wash hands at sink, completes with supervision. CGA ambulation in gage with RW. Difficulty maintaining RW straight, veering R and L. Unable to correct with cueing, stating she thinks it's the walker. Changed walker and same error continues. Uses rollator at baseline.     Patient End of Session: Up in chair;With  staff;Needs met;Call light within reach;RN aware of session/findings;All patient questions and concerns addressed;Alarm set    OCCUPATIONAL PROFILE    HOME SITUATION  Type of Home: Assisted living facility ([facility])  Home Layout: One level  Lives With: Spouse;Staff 24 hours    Toilet and Equipment: Comfort height toilet;Grab bar  Shower/Tub and Equipment: Walk-in shower;Grab bar;Shower chair          Hand Dominance: Right  Drives: No  Patient Regularly Uses: Glasses    Prior Level of Function: Pt has assist for bathing, laundry, cleaning, and medications. She sometimes dons socks mod (I) with sock aid, other times asks staff for assist with socks. Ambulates with rollator to dining gage.     SUBJECTIVE  Pt states she's upset because she thought therapy would see her earlier.    PAIN ASSESSMENT  Ratin  Location: denies       OBJECTIVE  Precautions: Bed/chair alarm  Fall Risk: Standard fall risk    WEIGHT BEARING RESTRICTION                   AM-PAC ‘6-Clicks’ Inpatient Daily Activity Short Form  -   Putting on and taking off regular lower body clothing?: A Lot  -   Bathing (including washing, rinsing, drying)?: A Lot  -   Toileting, which includes using toilet, bedpan or urinal? : A Little  -   Putting on and taking off regular upper body clothing?: A Little  -   Taking  care of personal grooming such as brushing teeth?: A Little  -   Eating meals?: None    AM-PAC Score:  Score: 17  Approx Degree of Impairment: 50.11%  Standardized Score (AM-PAC Scale): 37.26      ADDITIONAL TESTS     NEUROLOGICAL FINDINGS        PLAN   Patient has been evaluated and presents with no skilled Occupational Therapy needs at this time.  Patient discharged from Occupational Therapy services.  Please re-order if a new functional limitation presents during this admission.      Patient Evaluation Complexity Level:   Occupational Profile/Medical History LOW   Specific performance deficits impacting engagement in ADL/IADL LOW   Client Assessment/Performance Deficits LOW   Clinical Decision Making LOW   Overall Complexity LOW     OT Session Time: 45 minutes  Self-Care Home Management: 15 minutes  Therapeutic Activity: 15 minutes  Neuromuscular Re-education:  minutes  Therapeutic Exercise: minutes

## 2025-02-08 NOTE — PROGRESS NOTES
[facility]  Cardiology Progress Note    Guillermina Love Patient Status:  Inpatient    1/15/1940 MRN [id number]   Location [facility] 8NE-A Attending Effie Giordano,    Hosp Day # 1 PCP Trell Isabel       Subjective:  No acute events overnight  No SOB today    --------------------------------------------------------------------------------------------------------------------------------  ROS 12 systems reviewed and at baseline, pertinent findings above.      History:  Past Medical History:    Asthma ([facility])    CAD (coronary artery disease)    CHF (congestive heart failure) ([facility])    Diabetes ([facility])    Esophageal reflux    Essential hypertension    Hearing impairment    High blood pressure    Hyperlipidemia     Past Surgical History:   Procedure Laterality Date    Cath drug eluting stent      Hip fracture surgery Left     Removal gallbladder      Tonsillectomy       No family history on file.   reports that she has been smoking cigarettes. She uses smokeless tobacco. She reports that she does not currently use alcohol. She reports that she does not use drugs.    Objective:   Temp: 98.9 °F (37.2 °C)  Pulse: 77  Resp: 22  BP: 150/75    Intake/Output:     Intake/Output Summary (Last 24 hours) at 2025 0857  Last data filed at 2025 0804  Gross per 24 hour   Intake 360 ml   Output 2000 ml   Net -1640 ml       Physical Exam:  General: NAD.  HEENT: Normocephalic.  Neck: Supple.  Cardiac: RRR. S1, S2 normal. No murmur.  Lungs: GAEB.  Extremities: No edema.      Assessment:    CHF. Mild, resolved.  CAD s/p PCI  HTN  DLP  DM2  PAF    Plan:  Improved with diuresis. Switch to PO lasix 40mg PO every day.  Reviewed TTE, showing severely elevated PASP. Would benefit from outpt RHC. Will consider referral to advanced heart failure clinic and maybe initiation of SGLT2 inhibitors  Continue home cardiac regimen. Okay for DC    Discussed with patient. Questions answered.    Please call with questions.     Level of care:  L3    Sheridan Hunt MD  Interventional Cardiology  [facility]    2/8/2025  8:57 AM

## 2025-02-09 NOTE — DISCHARGE SUMMARY
[facility] Hospitalist Discharge Summary     Patient ID:  Guillermina Love  85 year old  1/15/1940    Admit date: 2/7/2025  Discharge date and time: 2/8/2025  4:37 PM   Attending Physician: No att. providers found   Primary Care Physician: Trell Isabel   Discharge Diagnoses: Acute on chronic systolic congestive heart failure (HCC) [I50.23]    Discharge Condition: Stable    Disposition:  Home    Follow up:   - PCP within 1 week  - Consults: Cardiology    Hospital Course:  85 year old female with PMH sig for diastolic heart failure, CAD s/p PCI, hypertension, T2DM, hyperlipidemia, A-fib not on AC who presents for shortness of breath.  Patient states she developed shortness of breath 2 days ago which suddenly worsened around 2 AM this morning.  She currently lives in assisted living and her daughter was visiting, she subsequently called EMS.  Patient states she has been recommended to take diuretics however the patient has deferred.     Workup sent for elevated blood pressure, BNP 1652, creatinine 0.79.  Chest x-ray significant for interstitial edema, cardiomegaly, bilateral pleural effusions.  Patient placed on BiPAP and given IV Lasix in the ED.  Cardiology consulted    #Stage C/NYHA III/HFpEF, acute on chronic  #Acute pulmonary edema  #Acute hypoxic respiratory failure requiring NIPPV  #Hypertensive urgency  -s/p IV diuresis  -Strict I's/O, daily weights, fluid restriction  -Trend renal function  -Weaned off BiPAP and NC  -Echo EF 65-70%, severely elevated PASP  -Continue metoprolol and amlodipine  -Cardiology following. Discharge on PO 40mg lasix. Follow up outpatient, may need to consider RHC.      #Hypokalemia  #Hypomagnesemia  -Replete as appropriate     #A-fib  -Patient has deferred anticoagulation in the past  -Continue metoprolol     #CAD s/p PCI  -Continue aspirin, statin     #Hypertension  -amlodipine and metoprolol as above     #T2DM  -Degludec 5 units daily, SSI while inpatient  -Accu-Cheks        Exam on Day of  DC:  /71 (BP Location: Left arm)   Pulse 77   Temp 97.4 °F (36.3 °C) (Oral)   Resp 20   Ht 5' 3\" (1.6 m)   Wt 148 lb 13 oz (67.5 kg)   SpO2 93%   BMI 26.36 kg/m²   Physical Exam:  General: Alert, awake, cooperative.  HEENT:  Normocephalic, atraumatic.  Neck:  Trachea midline.  Neck supple.  Chest: Decreased breath sounds bilaterally. No wheezes, rales, or rhonchi.  CV:  Regular rate and rhythm.  Positive S1/S2. No murmur, no gallops, no rubs  GI: Bowel sounds present in all four quadrants, abdomen is soft, non-tender, non-distended.  Extremities: Trace lower extremity edema.  No cyanosis.  Neurological:  AAOx4.  Moving all extremities.  Skin:  Warm and dry.      I as the attending physician reconciled the current and discharge medications on day of discharge.     Discharge Medication List as of 2/8/2025  3:07 PM        CONTINUE these medications which have CHANGED    Details   furosemide 40 MG Oral Tab Take 1 tablet (40 mg total) by mouth daily., Normal, Disp-30 tablet, R-1           CONTINUE these medications which have NOT CHANGED    Details   metFORMIN  MG Oral Tablet 24 Hr Take 1 tablet (750 mg total) by mouth daily with breakfast., Historical      nitrofurantoin monohydrate macro 100 MG Oral Cap Take 50 mg by mouth daily. Take once daily, Historical      pravastatin 10 MG Oral Tab Take 1 tablet (10 mg total) by mouth nightly., Historical      amLODIPine 5 MG Oral Tab Take 1 tablet (5 mg total) by mouth daily., Historical      aspirin 81 MG Oral Tab EC Take 1 tablet (81 mg total) by mouth daily., Historical      Cranberry 425 MG Oral Cap Take by mouth 2 (two) times daily., Historical      donepezil 10 MG Oral Tab Take 1 tablet (10 mg total) by mouth nightly., Historical      famotidine 20 MG Oral Tab Take 1 tablet (20 mg total) by mouth 2 (two) times daily., Historical      insulin glargine 100 UNIT/ML Subcutaneous Solution Inject 5 Units into the skin every morning., Historical       metoprolol succinate ER 50 MG Oral Tablet 24 Hr Take 1 tablet (50 mg total) by mouth daily., Historical      loperamide 2 MG Oral Cap Take 1 capsule (2 mg total) by mouth as needed for Diarrhea., Historical      Cholecalciferol (VITAMIN D) 1000 units Oral Tab Take 1,000 Units by mouth., Historical      multivitamin Oral Tab Take 1 tablet by mouth daily., Historical      Linagliptin (TRADJENTA) 5 MG Oral Tab Take 1 tablet (5 mg total) by mouth., Historical      B Complex-C-Folic Acid ( VITAMIN B COMPLEX/VITAMIN C) Oral Tab Take by mouth., Historical           STOP taking these medications       potassium chloride 10 MEQ Oral Tab CR        amoxicillin clavulanate 875-125 MG Oral Tab        dicyclomine 20 MG Oral Tab        Niacin ER 1000 MG Oral Tab CR        Pantoprazole Sodium 20 MG Oral Tab EC              Effie Giordano DO  [facility]

## 2025-02-21 NOTE — PROGRESS NOTES
Provider Clarification    Additional information on the patient's heart failure    Type: Diastolic congestive heart failure   Acuity: Acute on chronic    This note is part of the patient's medical record.

## 2025-02-24 VITALS
RESPIRATION RATE: 20 BRPM | OXYGEN SATURATION: 93 % | TEMPERATURE: 97 F | SYSTOLIC BLOOD PRESSURE: 122 MMHG | HEIGHT: 63 IN | DIASTOLIC BLOOD PRESSURE: 71 MMHG | BODY MASS INDEX: 26.82 KG/M2 | WEIGHT: 151.38 LBS | HEART RATE: 77 BPM

## 2025-03-15 ENCOUNTER — LAB ENCOUNTER (OUTPATIENT)
Dept: LAB | Facility: HOSPITAL | Age: 85
End: 2025-03-15
Attending: STUDENT IN AN ORGANIZED HEALTH CARE EDUCATION/TRAINING PROGRAM
Payer: MEDICARE

## 2025-03-15 DIAGNOSIS — R73.09 ELEVATED GLUCOSE: ICD-10-CM

## 2025-03-15 DIAGNOSIS — E55.9 AVITAMINOSIS D: ICD-10-CM

## 2025-03-15 DIAGNOSIS — I27.20 PORTOPULMONARY HYPERTENSION (HCC): ICD-10-CM

## 2025-03-15 DIAGNOSIS — R09.02 HYPOXEMIA: ICD-10-CM

## 2025-03-15 DIAGNOSIS — I50.9 ACUTE HEART FAILURE, UNSPECIFIED HEART FAILURE TYPE (HCC): Primary | ICD-10-CM

## 2025-03-15 DIAGNOSIS — K76.6 PORTOPULMONARY HYPERTENSION (HCC): ICD-10-CM

## 2025-03-15 LAB
BASOPHILS # BLD AUTO: 0.03 X10(3) UL (ref 0–0.2)
BASOPHILS NFR BLD AUTO: 0.5 %
CHOLEST SERPL-MCNC: 177 MG/DL (ref ?–200)
DEPRECATED HBV CORE AB SER IA-ACNC: 29 NG/ML
EOSINOPHIL # BLD AUTO: 0.11 X10(3) UL (ref 0–0.7)
EOSINOPHIL NFR BLD AUTO: 1.8 %
ERYTHROCYTE [DISTWIDTH] IN BLOOD BY AUTOMATED COUNT: 13.7 %
ERYTHROCYTE [SEDIMENTATION RATE] IN BLOOD: 16 MM/HR
EST. AVERAGE GLUCOSE BLD GHB EST-MCNC: 183 MG/DL (ref 68–126)
FASTING PATIENT LIPID ANSWER: YES
FOLATE SERPL-MCNC: 14.2 NG/ML (ref 5.4–?)
HBA1C MFR BLD: 8 % (ref ?–5.7)
HBV CORE AB SERPL QL IA: NONREACTIVE
HCT VFR BLD AUTO: 39.8 %
HDLC SERPL-MCNC: 44 MG/DL (ref 40–59)
HGB BLD-MCNC: 13.3 G/DL
IMM GRANULOCYTES # BLD AUTO: 0.02 X10(3) UL (ref 0–1)
IMM GRANULOCYTES NFR BLD: 0.3 %
IRON SATN MFR SERPL: 19 %
IRON SERPL-MCNC: 68 UG/DL
LDLC SERPL CALC-MCNC: 96 MG/DL (ref ?–100)
LYMPHOCYTES # BLD AUTO: 1.31 X10(3) UL (ref 1–4)
LYMPHOCYTES NFR BLD AUTO: 21.6 %
MCH RBC QN AUTO: 26.9 PG (ref 26–34)
MCHC RBC AUTO-ENTMCNC: 33.4 G/DL (ref 31–37)
MCV RBC AUTO: 80.6 FL
MONOCYTES # BLD AUTO: 0.48 X10(3) UL (ref 0.1–1)
MONOCYTES NFR BLD AUTO: 7.9 %
NEUTROPHILS # BLD AUTO: 4.11 X10 (3) UL (ref 1.5–7.7)
NEUTROPHILS # BLD AUTO: 4.11 X10(3) UL (ref 1.5–7.7)
NEUTROPHILS NFR BLD AUTO: 67.9 %
NONHDLC SERPL-MCNC: 133 MG/DL (ref ?–130)
NT-PROBNP SERPL-MCNC: 639 PG/ML (ref ?–450)
PLATELET # BLD AUTO: 200 10(3)UL (ref 150–450)
RBC # BLD AUTO: 4.94 X10(6)UL
RHEUMATOID FACT SERPL-ACNC: 8 IU/ML (ref ?–14)
TOTAL IRON BINDING CAPACITY: 360 UG/DL (ref 250–425)
TRANSFERRIN SERPL-MCNC: 269 MG/DL (ref 250–380)
TRIGL SERPL-MCNC: 214 MG/DL (ref 30–149)
TSI SER-ACNC: 0.98 UIU/ML (ref 0.55–4.78)
VIT B12 SERPL-MCNC: 358 PG/ML (ref 211–911)
VIT D+METAB SERPL-MCNC: 37.6 NG/ML (ref 30–100)
VLDLC SERPL CALC-MCNC: 35 MG/DL (ref 0–30)
WBC # BLD AUTO: 6.1 X10(3) UL (ref 4–11)

## 2025-03-15 PROCEDURE — 83540 ASSAY OF IRON: CPT

## 2025-03-15 PROCEDURE — 86039 ANTINUCLEAR ANTIBODIES (ANA): CPT

## 2025-03-15 PROCEDURE — 85732 THROMBOPLASTIN TIME PARTIAL: CPT

## 2025-03-15 PROCEDURE — 85610 PROTHROMBIN TIME: CPT

## 2025-03-15 PROCEDURE — 85025 COMPLETE CBC W/AUTO DIFF WBC: CPT

## 2025-03-15 PROCEDURE — 85705 THROMBOPLASTIN INHIBITION: CPT

## 2025-03-15 PROCEDURE — 82607 VITAMIN B-12: CPT

## 2025-03-15 PROCEDURE — 84443 ASSAY THYROID STIM HORMONE: CPT

## 2025-03-15 PROCEDURE — 82746 ASSAY OF FOLIC ACID SERUM: CPT

## 2025-03-15 PROCEDURE — 86431 RHEUMATOID FACTOR QUANT: CPT

## 2025-03-15 PROCEDURE — 83880 ASSAY OF NATRIURETIC PEPTIDE: CPT

## 2025-03-15 PROCEDURE — 86038 ANTINUCLEAR ANTIBODIES: CPT

## 2025-03-15 PROCEDURE — 83036 HEMOGLOBIN GLYCOSYLATED A1C: CPT

## 2025-03-15 PROCEDURE — 83550 IRON BINDING TEST: CPT

## 2025-03-15 PROCEDURE — 86704 HEP B CORE ANTIBODY TOTAL: CPT

## 2025-03-15 PROCEDURE — 36415 COLL VENOUS BLD VENIPUNCTURE: CPT

## 2025-03-15 PROCEDURE — 82728 ASSAY OF FERRITIN: CPT

## 2025-03-15 PROCEDURE — 82306 VITAMIN D 25 HYDROXY: CPT

## 2025-03-15 PROCEDURE — 85652 RBC SED RATE AUTOMATED: CPT

## 2025-03-15 PROCEDURE — 80061 LIPID PANEL: CPT

## 2025-03-15 PROCEDURE — 85613 RUSSELL VIPER VENOM DILUTED: CPT

## 2025-03-18 LAB
ANA NUCLEOLAR TITR SER IF: 80 {TITER}
APTT PPP: 36.1 SECONDS (ref 23–36)
INR BLD: 1.13 (ref 0.85–1.16)
LA 3 SCREEN W REFLEX-IMP: NEGATIVE
NUCLEAR IGG TITR SER IF: POSITIVE {TITER}
PROTHROMBIN TIME: 14.7 SECONDS (ref 11.6–14.8)
SCREEN DRVVT: 1.09 S (ref 0–1.29)
SCREEN DRVVT: NEGATIVE S
STACLOT LA DELTA: 4 SECONDS (ref ?–8)

## 2025-04-28 ENCOUNTER — HOSPITAL ENCOUNTER (OUTPATIENT)
Dept: CT IMAGING | Facility: HOSPITAL | Age: 85
Discharge: HOME | End: 2025-04-28
Attending: STUDENT IN AN ORGANIZED HEALTH CARE EDUCATION/TRAINING PROGRAM
Payer: MEDICARE

## 2025-04-28 DIAGNOSIS — I27.20 PULMONARY HYPERTENSION (HCC): ICD-10-CM

## 2025-04-28 DIAGNOSIS — I50.9 ACUTE ON CHRONIC CONGESTIVE HEART FAILURE, UNSPECIFIED HEART FAILURE TYPE (HCC): ICD-10-CM

## 2025-04-28 DIAGNOSIS — R09.02 HYPOXIA: ICD-10-CM

## 2025-04-28 PROCEDURE — 71250 CT THORAX DX C-: CPT | Performed by: STUDENT IN AN ORGANIZED HEALTH CARE EDUCATION/TRAINING PROGRAM

## 2025-05-12 VITALS — HEIGHT: 62 IN | WEIGHT: 152 LBS | BODY MASS INDEX: 27.97 KG/M2

## 2025-05-12 RX ORDER — SPIRONOLACTONE 25 MG/1
25 TABLET ORAL DAILY
COMMUNITY

## 2025-05-12 NOTE — PAT NURSING NOTE
PreOp Instructions     You are scheduled for: a Cardiac Procedure     Date of Procedure: 05/14/25     Diet Instructions: Do not eat or drink anything after midnight including gum, mints, candy, etc.     Medications: Medications you are allowed to take can be taken with a sip of water the morning of your procedure.     Medications to Stop: DO NOT TAKE any herbal supplements and vitamins the morning of your procedure.     Diabetic Instructions:   DO NOT TAKE the morning dose of your diabetic medications (Metformin and Tradjenta).   Take 1/2 morning dose of your long-acting Insulin only if glucose is greater than 120.    Skin Prep : Shower with antibacterial soap using a clean washcloth, prior to procedure. Once dried off, no lotions/powders/creams/ointments, etc., Do not shave the procedure area, this will be completed at the hospital during the preparation phase.     Arrival Time: The day prior to your procedure you will receive a phone call between 3:00 pm - 6:00 pm with your arrival time. If you haven't received a phone call, please check your voicemail messages., If you did not receive a voice mail and it is after 6:00 pm, please call the nursing supervisor at [phone].    Driving After Procedure: No sedation given - may drive self home.     Discharge Teaching: Your nurse will give you specific instructions before discharge, Most people can resume normal activities in 24 hours, Any questions, please call the physician's office

## 2025-05-14 ENCOUNTER — HOSPITAL ENCOUNTER (OUTPATIENT)
Dept: INTERVENTIONAL RADIOLOGY/VASCULAR | Facility: HOSPITAL | Age: 85
Discharge: HOME | End: 2025-05-14
Attending: STUDENT IN AN ORGANIZED HEALTH CARE EDUCATION/TRAINING PROGRAM
Payer: MEDICARE

## 2025-06-04 NOTE — H&P
Agree with H and p as written. Agree with physical exam - no changes. No interval changes otherwise.     Discussed RHC including risks and benefits, she is agreeable to proceeding.     Eliseo Olguin MD   Advanced Heart Failure and Transplant Cardiology   [facility] ([facility])

## 2025-06-13 NOTE — PAT NURSING NOTE
PAT call with patients daughter, Maritza. The following instructions were given and sent through the pt's My Chart. Questions answered and she verbalized understanding.    PreOp Instructions    You are scheduled for: a Cardiac Procedure    Date of Procedure: 06/18/25    Diet Instructions: Do not eat or drink anything after midnight including gum, mints, candy, etc.    Medications: Take Aspirin 81 mg x 4 tablets the day of your procedure, Medications you are allowed to take can be taken with a sip of water the morning of your procedure    Medications to Stop: Hold herbal supplements and vitamins    Diabetic Instructions: Do not take morning dose of your diabetic medications, Take 1/2 morning dose of your long-acting Insulin only if glucose is greater than 120, Metformin needs to be held 48 hours prior to procedure, your last dose should be the morning dose, two days before your procedure (6/16/25 AM dose).    Skin Prep : Shower with antibacterial soap using a clean washcloth, prior to procedure. Once dried off, no lotions/powders/creams/ointments, etc.    Arrival Time: The day prior to your procedure you will receive a phone call before 6:00 pm with your arrival time. If you haven't received a phone call, please check your voicemail messages., If you did not receive a voice mail and it is after 6:00 pm, please call the nursing supervisor at [phone].    Driving After Procedure: Sedation will be given so you WILL NOT be able to drive home. You will need a responsible adult  to drive you home. You can NOT take uber or taxi unless approved by your physician in advance.    Discharge Teaching: Your nurse will give you specific instructions before discharge, Any questions, please call the physician's office         [place] parking is available starting at 6 am or park in the [place] parking garage at [facility]. Check in at the [facility] reception desk. Our  will be there to check you  in for your procedure. Please bring your insurance cards and ID with you.                                                                                                                                      Please DO NOT respond to this message, the inbasket is not monitored for messages. For any questions, please call the physician's office.

## 2025-06-18 ENCOUNTER — HOSPITAL ENCOUNTER (OUTPATIENT)
Dept: INTERVENTIONAL RADIOLOGY/VASCULAR | Facility: HOSPITAL | Age: 85
Discharge: HOME | End: 2025-06-18
Attending: STUDENT IN AN ORGANIZED HEALTH CARE EDUCATION/TRAINING PROGRAM | Admitting: STUDENT IN AN ORGANIZED HEALTH CARE EDUCATION/TRAINING PROGRAM
Payer: MEDICARE

## 2025-06-18 VITALS
SYSTOLIC BLOOD PRESSURE: 112 MMHG | WEIGHT: 155 LBS | BODY MASS INDEX: 28.52 KG/M2 | OXYGEN SATURATION: 92 % | HEART RATE: 61 BPM | DIASTOLIC BLOOD PRESSURE: 82 MMHG | RESPIRATION RATE: 12 BRPM | HEIGHT: 62 IN | TEMPERATURE: 97 F

## 2025-06-18 DIAGNOSIS — I50.30 (HFPEF) HEART FAILURE WITH PRESERVED EJECTION FRACTION (HCC): ICD-10-CM

## 2025-06-18 LAB — GLUCOSE BLD-MCNC: 197 MG/DL (ref 70–99)

## 2025-06-18 PROCEDURE — 93451 RIGHT HEART CATH: CPT

## 2025-06-18 PROCEDURE — 93463 DRUG ADMIN & HEMODYNMIC MEAS: CPT

## 2025-06-18 PROCEDURE — 82962 GLUCOSE BLOOD TEST: CPT

## 2025-06-18 RX ORDER — SODIUM CHLORIDE 9 MG/ML
INJECTION, SOLUTION INTRAVENOUS
Status: DISCONTINUED | OUTPATIENT
Start: 2025-06-19 | End: 2025-06-18 | Stop reason: HOSPADM

## 2025-06-18 RX ORDER — SODIUM NITROPRUSSIDE 25 MG/ML
INJECTION INTRAVENOUS
Status: COMPLETED
Start: 2025-06-18 | End: 2025-06-18

## 2025-06-18 RX ORDER — HEPARIN SODIUM 5000 [USP'U]/ML
INJECTION, SOLUTION INTRAVENOUS; SUBCUTANEOUS
Status: COMPLETED
Start: 2025-06-18 | End: 2025-06-18

## 2025-06-18 RX ORDER — LIDOCAINE HYDROCHLORIDE 10 MG/ML
INJECTION, SOLUTION EPIDURAL; INFILTRATION; INTRACAUDAL; PERINEURAL
Status: COMPLETED
Start: 2025-06-18 | End: 2025-06-18

## 2025-06-18 NOTE — PROCEDURES
Right Heart Catheterization    Procedures:    Right Heart Catheterization    Procedure details:    After consent obtained from patient, patient was brought to the cardiac catheterization lab. Patient was prepped and draped in usual sterile fashion. Lidocaine 1% was used to infiltrate site of access. Access was obtained with ultrasound guidance. Ultrasound guide and needle kit utilized to access site, and advanced sheath (7) using modified seldinger technique.     RHC was performed using swan-colton catheter and cardiac output using VESNA equation was also performed.  See below for data.       Indication:  pHTN    Access Site: Right Internal Jugular Vein      Sheath Size (Fr): 7    Complications:  none apprent    Findings:  BP: 155/98 mmHg  RA: 5 mmHg  RV: 62/7 mmHg  PA: 58/21 (mean 36) mmHg  PCWP: 16 mmHg  TP mmHg      Vesna  CO: 4.4 liters/min  CI: 2.6 liters/min/m2  PVR: 4.4 Wood units  PVRI: 7.4 Wood units/m2    Condition 2 - IV SNP 1.0 mcg/kg/min    PA - 49/17 (mean 32)  PCWP - 14    CO  - 5.4  CI - 3.1  PVR - 3.8  PVRI - 6.7    Condition 3 - IV SNP 1.5 mcg/kg/min     PA - 33/10 (mean 20)   PCWP - 8     CO - 4.9  CI - 2.9  PVR - 2.8  PVRI - 5.2    At 1.5 mcg/kg/min, BP decreased to 98/54 (asymptomatic)     IV SNP - showed reversibility in reducing PA pressures and maintaining cardiac indices without increase in filling pressures - improved PVR as well.     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   Reversibility present with IV SNP challenge (vasodilator)     Disposition:   - Discussed post sheath removal verbal instructions to patient.  - discussed results with patient and given reversiblity, can consider PDE inhibitor as outpatient for long term management, continue risk factor modification otherwise, and volume management as on medical therapy.   Attempted to call daughter (as they were not present at bedside) but unable to reach - will continue to  follow up and discuss with family as able.     Eliseo Olguin M.D.  Advanced Heart Failure and Transplant Cardiology

## 2025-06-18 NOTE — PROGRESS NOTES
Pt tolerated po well, neck dressing c/d/I. Discharge instruction reviewed w pt and daughter. IV d.cd and pt discharged to home in stable condition.

## 2025-06-18 NOTE — DISCHARGE INSTRUCTIONS
See post venous access discharge instruction sheet    HOME CARE INSTRUCTIONS FOLLOWING VENOUS ACCESS PROCEDURES:   MYOCARDIAL BIOPSY, RIGHT HEART CATHETERIZATION, VENAGRAM, IVC FILTER INSERTION, CLOSURE OF ASD OR PFO     Activity    DO NOT drive after the procedure. You may resume driving the following day according to the nurse or physician's instructions    Plan on resting and relaxing tonight and tomorrow    Do not lift anything over 10 pounds for the next 24 hours    Avoid sexual activity for the next 24 hours    Avoid drinking alcohol for the next 24 hours    Resume your normal activity after 24 hours, or as instructed by your physician     What is Normal?    The procedure site may appear bruised or discolored    The procedure site may be tender to the touch    There may be a small amount of drainage on the bandage     Special Instructions    The bandage is to remain in place for 24 hours    After 24 hours, you must remove the bandage. You should shower after removing the bandage, and wash the procedure site gently with soap and water. (If you choose to wear a bandage for a few days, make sure it remains clean and dry and that it is changed daily.)     When you should NOTIFY YOUR PHYSICIAN    If you have shortness of breath or a persistent cough    If you have chest pain (angina)    If you have palpitations or irregular heart beats    If you have persistent pain at the procedure site    If you experience signs of a fever, temperature >101o, chills, infection (redness, swelling, thick yellow drainage, or a foul odor from the procedure site)     Other    You may resume your present diet, unless otherwise specified by your physician    You may resume all of your medications as prescribed, unless otherwise directed by your physician. A list of your medications was provided to you at discharge    Please call your physician's office for a follow-up appointment. You should be seen in 1 to 2 weeks     Do not make any  personal/business decisions and/or sign any legal documents for the next 24 hours.

## (undated) NOTE — ED AVS SNAPSHOT
[facility] Emergency Department  [place]  [address] [address]  Phone:  [phone]  Fax:  [address]   MRN: [id number]    Department:  [facility] Emergency Department   Date of Visit:  7/7/2017 CARE PHYSICIAN AT ONCE OR RETURN IMMEDIATELY TO THE EMERGENCY DEPARTMENT.     If you have been prescribed any medication(s), please fill your prescription right away and begin taking the medication(s) as directed    If the emergency physician has read X-ray